# Patient Record
Sex: FEMALE | Race: WHITE | NOT HISPANIC OR LATINO
[De-identification: names, ages, dates, MRNs, and addresses within clinical notes are randomized per-mention and may not be internally consistent; named-entity substitution may affect disease eponyms.]

---

## 2017-04-20 ENCOUNTER — APPOINTMENT (OUTPATIENT)
Dept: UROLOGY | Facility: CLINIC | Age: 67
End: 2017-04-20

## 2017-04-20 VITALS
HEART RATE: 80 BPM | TEMPERATURE: 98.4 F | SYSTOLIC BLOOD PRESSURE: 110 MMHG | OXYGEN SATURATION: 97 % | DIASTOLIC BLOOD PRESSURE: 80 MMHG

## 2017-04-20 DIAGNOSIS — R31.29 OTHER MICROSCOPIC HEMATURIA: ICD-10-CM

## 2017-04-24 LAB
APPEARANCE: CLEAR
BACTERIA UR CULT: NORMAL
BACTERIA: NEGATIVE
BILIRUBIN URINE: NEGATIVE
BLOOD URINE: NEGATIVE
COLOR: YELLOW
CORE LAB FLUID CYTOLOGY: NORMAL
GLUCOSE QUALITATIVE U: NORMAL MG/DL
HYALINE CASTS: 1 /LPF
KETONES URINE: NEGATIVE
LEUKOCYTE ESTERASE URINE: NEGATIVE
MICROSCOPIC-UA: NORMAL
NITRITE URINE: NEGATIVE
PH URINE: 6.5
PROTEIN URINE: NEGATIVE MG/DL
RED BLOOD CELLS URINE: 1 /HPF
SPECIFIC GRAVITY URINE: 1.02
SQUAMOUS EPITHELIAL CELLS: 1 /HPF
UROBILINOGEN URINE: NORMAL MG/DL
WHITE BLOOD CELLS URINE: 0 /HPF

## 2017-05-25 ENCOUNTER — APPOINTMENT (OUTPATIENT)
Dept: UROLOGY | Facility: CLINIC | Age: 67
End: 2017-05-25

## 2017-05-25 VITALS — OXYGEN SATURATION: 96 % | HEART RATE: 72 BPM | SYSTOLIC BLOOD PRESSURE: 111 MMHG | DIASTOLIC BLOOD PRESSURE: 69 MMHG

## 2019-05-01 ENCOUNTER — APPOINTMENT (OUTPATIENT)
Dept: UROLOGY | Facility: CLINIC | Age: 69
End: 2019-05-01

## 2019-05-29 ENCOUNTER — APPOINTMENT (OUTPATIENT)
Dept: UROLOGY | Facility: CLINIC | Age: 69
End: 2019-05-29
Payer: MEDICARE

## 2019-05-29 ENCOUNTER — OUTPATIENT (OUTPATIENT)
Dept: OUTPATIENT SERVICES | Facility: HOSPITAL | Age: 69
LOS: 1 days | End: 2019-05-29
Payer: MEDICARE

## 2019-05-29 VITALS
SYSTOLIC BLOOD PRESSURE: 158 MMHG | BODY MASS INDEX: 22.97 KG/M2 | HEIGHT: 60 IN | OXYGEN SATURATION: 98 % | HEART RATE: 86 BPM | DIASTOLIC BLOOD PRESSURE: 76 MMHG | WEIGHT: 117 LBS

## 2019-05-29 PROCEDURE — 52000 CYSTOURETHROSCOPY: CPT

## 2019-06-06 DIAGNOSIS — D49.4 NEOPLASM OF UNSPECIFIED BEHAVIOR OF BLADDER: ICD-10-CM

## 2019-06-06 LAB — URINE CYTOLOGY: NORMAL

## 2019-07-31 ENCOUNTER — LABORATORY RESULT (OUTPATIENT)
Age: 69
End: 2019-07-31

## 2019-07-31 ENCOUNTER — APPOINTMENT (OUTPATIENT)
Dept: INTERNAL MEDICINE | Facility: CLINIC | Age: 69
End: 2019-07-31
Payer: MEDICARE

## 2019-07-31 VITALS
DIASTOLIC BLOOD PRESSURE: 68 MMHG | BODY MASS INDEX: 39.09 KG/M2 | RESPIRATION RATE: 16 BRPM | OXYGEN SATURATION: 98 % | TEMPERATURE: 98.7 F | SYSTOLIC BLOOD PRESSURE: 122 MMHG | WEIGHT: 199.13 LBS | HEIGHT: 60 IN | HEART RATE: 75 BPM

## 2019-07-31 PROCEDURE — 36415 COLL VENOUS BLD VENIPUNCTURE: CPT

## 2019-07-31 PROCEDURE — 99214 OFFICE O/P EST MOD 30 MIN: CPT | Mod: 25

## 2019-07-31 NOTE — PHYSICAL EXAM
[No Acute Distress] : no acute distress [Well Nourished] : well nourished [Well Developed] : well developed [Well-Appearing] : well-appearing [PERRL] : pupils equal round and reactive to light [Normal Sclera/Conjunctiva] : normal sclera/conjunctiva [EOMI] : extraocular movements intact [Normal Outer Ear/Nose] : the outer ears and nose were normal in appearance [Normal Oropharynx] : the oropharynx was normal [No JVD] : no jugular venous distention [Supple] : supple [No Lymphadenopathy] : no lymphadenopathy [Thyroid Normal, No Nodules] : the thyroid was normal and there were no nodules present [No Respiratory Distress] : no respiratory distress  [No Accessory Muscle Use] : no accessory muscle use [Clear to Auscultation] : lungs were clear to auscultation bilaterally [Normal Rate] : normal rate  [Regular Rhythm] : with a regular rhythm [No Murmur] : no murmur heard [Normal S1, S2] : normal S1 and S2 [No Carotid Bruits] : no carotid bruits [No Abdominal Bruit] : a ~M bruit was not heard ~T in the abdomen [No Varicosities] : no varicosities [Pedal Pulses Present] : the pedal pulses are present [No Edema] : there was no peripheral edema [No Palpable Aorta] : no palpable aorta [No Extremity Clubbing/Cyanosis] : no extremity clubbing/cyanosis [Non Tender] : non-tender [Soft] : abdomen soft [Non-distended] : non-distended [No Masses] : no abdominal mass palpated [No HSM] : no HSM [Normal Bowel Sounds] : normal bowel sounds [Normal Posterior Cervical Nodes] : no posterior cervical lymphadenopathy [Normal Anterior Cervical Nodes] : no anterior cervical lymphadenopathy [No CVA Tenderness] : no CVA  tenderness [No Spinal Tenderness] : no spinal tenderness [No Joint Swelling] : no joint swelling [Grossly Normal Strength/Tone] : grossly normal strength/tone [No Rash] : no rash [Coordination Grossly Intact] : coordination grossly intact [No Focal Deficits] : no focal deficits [Normal Gait] : normal gait [Deep Tendon Reflexes (DTR)] : deep tendon reflexes were 2+ and symmetric [Normal Insight/Judgement] : insight and judgment were intact [Normal Affect] : the affect was normal

## 2019-08-01 ENCOUNTER — RX RENEWAL (OUTPATIENT)
Age: 69
End: 2019-08-01

## 2019-08-01 NOTE — ADDENDUM
[FreeTextEntry1] : I, Rudy Rome, acted solely as scribe for Dr. Giorgi Garcia DO on this date 07/31/2019  1:30PM .\par \par All medical record entries made by the Scribe were at my, Dr. Giorgi Garcia DO direction and personally dictated by me on 07/31/2019  1:30PM. I have reviewed the chart and agree that the record accurately reflects my personal performance of the history, physical exam, assessment and plan. I have also personally directed, reviewed and agreed with the chart.\par

## 2019-08-01 NOTE — HISTORY OF PRESENT ILLNESS
[FreeTextEntry1] : blood work and general follow-up\par  [de-identified] : Patient is a 69 year old female with a past medical history as below who presents for blood work and general follow-up. Patient states she is taking all medications as prescribed and denies any adverse reactions or side effects. She denies heat/cold intolerance on Levothyroxine. She also notes she is currently taking numerous vitamins/supplements. Patient states last blood work was done about 1 year ago. Patient notes recent trip to [place].

## 2019-08-01 NOTE — ASSESSMENT
[FreeTextEntry1] : Patient is a 69 year old female with a past medical history as above who presents for blood work and general follow-up.\par

## 2019-08-01 NOTE — PLAN
[FreeTextEntry1] : Endocrinology\par hypothyroidism - continue Levothyroxine Sodium 75mcg p.o.q.d. - check thyroid panel - advised to follow up every 6 months to have thyroid function checked \par hypercholesterolemia - continue Niacin 500mg p.o.q.d. - continue low cholesterol/low fat diet - check FLP and LFTs\par Infectious Disease\par Patient to follow up with list of vaccines she received prior to her trip to [place]. \par osteoporosis-patient has seen Dr. Thorpe and is taking only vitamin D and calcium, check DEXA scan q 2 years\par \par check female panel, thyroid panel, vitamin panel, iron/ferritin studies\par Patient to follow up for annual physical exam. \par \par \par \par

## 2019-08-05 LAB
24R-OH-CALCIDIOL SERPL-MCNC: 60.2 PG/ML
25(OH)D3 SERPL-MCNC: 86.9 NG/ML
ALBUMIN SERPL ELPH-MCNC: 4.9 G/DL
ALP BLD-CCNC: 63 U/L
ALT SERPL-CCNC: 28 U/L
ANION GAP SERPL CALC-SCNC: 13 MMOL/L
AST SERPL-CCNC: 22 U/L
BASOPHILS # BLD AUTO: 0.02 K/UL
BASOPHILS NFR BLD AUTO: 0.3 %
BILIRUB SERPL-MCNC: 0.3 MG/DL
BUN SERPL-MCNC: 16 MG/DL
CALCIUM SERPL-MCNC: 10.1 MG/DL
CHLORIDE SERPL-SCNC: 103 MMOL/L
CHOLEST SERPL-MCNC: 208 MG/DL
CHOLEST/HDLC SERPL: 4.2 RATIO
CO2 SERPL-SCNC: 25 MMOL/L
CREAT SERPL-MCNC: 0.63 MG/DL
EOSINOPHIL # BLD AUTO: 0.08 K/UL
EOSINOPHIL NFR BLD AUTO: 1.3 %
ESTIMATED AVERAGE GLUCOSE: 108 MG/DL
FERRITIN SERPL-MCNC: 91 NG/ML
GLUCOSE SERPL-MCNC: 102 MG/DL
HBA1C MFR BLD HPLC: 5.4 %
HCT VFR BLD CALC: 45.7 %
HCT VFR BLD CALC: 45.7 %
HDLC SERPL-MCNC: 49 MG/DL
HGB BLD-MCNC: 14.2 G/DL
HGB BLD-MCNC: 14.2 G/DL
IMM GRANULOCYTES NFR BLD AUTO: 0.3 %
IRON SATN MFR SERPL: 32 %
IRON SERPL-MCNC: 119 UG/DL
LDLC SERPL CALC-MCNC: 140 MG/DL
LYMPHOCYTES # BLD AUTO: 1.39 K/UL
LYMPHOCYTES NFR BLD AUTO: 23.4 %
MAGNESIUM SERPL-MCNC: 2 MG/DL
MAN DIFF?: NORMAL
MCHC RBC-ENTMCNC: 29.5 PG
MCHC RBC-ENTMCNC: 31.1 GM/DL
MCV RBC AUTO: 95 FL
MONOCYTES # BLD AUTO: 0.49 K/UL
MONOCYTES NFR BLD AUTO: 8.2 %
NEUTROPHILS # BLD AUTO: 3.95 K/UL
NEUTROPHILS NFR BLD AUTO: 66.5 %
PLATELET # BLD AUTO: 280 K/UL
POTASSIUM SERPL-SCNC: 4.4 MMOL/L
PROT SERPL-MCNC: 7.4 G/DL
RBC # BLD: 4.81 M/UL
RBC # FLD: 13.1 %
SODIUM SERPL-SCNC: 141 MMOL/L
T3 SERPL-MCNC: 141 NG/DL
T3FREE SERPL-MCNC: 3.61 PG/ML
T3RU NFR SERPL: 1.1 TBI
T4 FREE SERPL-MCNC: 1.2 NG/DL
T4 SERPL-MCNC: 7.6 UG/DL
THYROGLOB AB SERPL-ACNC: <20 IU/ML
THYROPEROXIDASE AB SERPL IA-ACNC: 145 IU/ML
TIBC SERPL-MCNC: 375 UG/DL
TRIGL SERPL-MCNC: 94 MG/DL
TSH SERPL-ACNC: 1.03 UIU/ML
UIBC SERPL-MCNC: 256 UG/DL
VIT B1 SERPL-MCNC: 153.2 NMOL/L
VIT B12 SERPL-MCNC: 503 PG/ML
VIT C SERPL-MCNC: 0.2 MG/DL
WBC # FLD AUTO: 5.95 K/UL

## 2019-08-06 ENCOUNTER — RX RENEWAL (OUTPATIENT)
Age: 69
End: 2019-08-06

## 2019-08-06 LAB — VIT A SERPL-MCNC: 47 UG/DL

## 2019-08-07 LAB — UBIQUINONE10 SERPL-MCNC: 2 MG/L

## 2019-10-21 ENCOUNTER — APPOINTMENT (OUTPATIENT)
Dept: INTERNAL MEDICINE | Facility: CLINIC | Age: 69
End: 2019-10-21
Payer: MEDICARE

## 2019-10-21 VITALS
HEART RATE: 69 BPM | SYSTOLIC BLOOD PRESSURE: 110 MMHG | BODY MASS INDEX: 23.26 KG/M2 | RESPIRATION RATE: 14 BRPM | OXYGEN SATURATION: 98 % | WEIGHT: 118.5 LBS | HEIGHT: 60 IN | TEMPERATURE: 98.2 F | DIASTOLIC BLOOD PRESSURE: 62 MMHG

## 2019-10-21 PROCEDURE — 36415 COLL VENOUS BLD VENIPUNCTURE: CPT

## 2019-10-21 PROCEDURE — G0444 DEPRESSION SCREEN ANNUAL: CPT | Mod: 59

## 2019-10-21 PROCEDURE — 99214 OFFICE O/P EST MOD 30 MIN: CPT | Mod: 25

## 2019-10-21 PROCEDURE — G0442 ANNUAL ALCOHOL SCREEN 15 MIN: CPT | Mod: 59

## 2019-10-21 NOTE — PLAN
[FreeTextEntry1] : Endocrinology\par hypothyroidism - continue Levothyroxine Sodium 75mcg p.o.q.d. as directed and Liothyronine Sodium 5mcg p.o.q.d. as directed  - thyroid panel to be rechecked in 3 months \par hypercholesterolemia - continue Niacin 500mg p.o.q.d. and Omega-3 1000mg p.o.q.d. - check FLP \par hyperglycemia - continue low carbohydrate/low sugar diet - check hemoglobin A1C\par vitamin D insufficiency - continue vitamin D 2000 unit capsules p.o.q.d. - check vitamin D \par Gynecology\par Will request results of last mammogram/breast US from [facility]\par Gastroenterology\par Will request results of last screening colonoscopy from colorectal surgeon, Dr. Cavanaugh \par Musculoskeletal\par osteoporosis - continue Glucosamine capsules p.o.q.d. - bone density testing (DEXA scan) to be done every 2 years\par \par check FLP, hemoglobin A1C, and vitamin D

## 2019-10-21 NOTE — HISTORY OF PRESENT ILLNESS
[FreeTextEntry1] : fasting blood work and general follow-up\par  [de-identified] : Patient is a 69 year old female with a past medical history as below who presents for fasting blood work and general follow-up. Patient states she is taking all medications as prescribed and denies any adverse reactions or side effects. She denies heat or cold intolerance on Levothyroxine/Liothyronine. Patient states last colonoscopy was 5 years ago with colorectal surgeon, Dr. Cavanaugh. She was instructed to follow up for her next screening colonoscopy after 10 years. Patient states she has breast imaging done annually at [facility]. She has seen gynecologist, Dr. Alas in the past year. Past carotid Doppler was normal.

## 2019-10-21 NOTE — ADDENDUM
[FreeTextEntry1] : I, Rudy Rome, acted solely as scribe for Dr. Giorgi Garcia DO on this date 10/21/2019 11:40AM .\par \par All medical record entries made by the Scribe were at my, Dr. Giorgi Garcia DO direction and personally dictated by me on 10/21/2019 11:40AM. I have reviewed the chart and agree that the record accurately reflects my personal performance of the history, physical exam, assessment and plan. I have also personally directed, reviewed and agreed with the chart.\par

## 2019-10-21 NOTE — HEALTH RISK ASSESSMENT
[Yes] : Yes [2 - 4 times a month (2 pts)] : 2-4 times a month (2 points) [Never (0 pts)] : Never (0 points) [1 or 2 (0 pts)] : 1 or 2 (0 points) [No] : In the past 12 months have you used drugs other than those required for medical reasons? No [0] : 1) Little interest or pleasure doing things: Not at all (0) [Audit-CScore] : 2 [] : No [[id number]] : 0

## 2019-10-21 NOTE — PHYSICAL EXAM
[No Acute Distress] : no acute distress [Well Nourished] : well nourished [Well Developed] : well developed [Well-Appearing] : well-appearing [Normal Sclera/Conjunctiva] : normal sclera/conjunctiva [PERRL] : pupils equal round and reactive to light [Normal Outer Ear/Nose] : the outer ears and nose were normal in appearance [EOMI] : extraocular movements intact [No JVD] : no jugular venous distention [Normal Oropharynx] : the oropharynx was normal [No Lymphadenopathy] : no lymphadenopathy [Supple] : supple [Thyroid Normal, No Nodules] : the thyroid was normal and there were no nodules present [No Respiratory Distress] : no respiratory distress  [No Accessory Muscle Use] : no accessory muscle use [Normal Rate] : normal rate  [Clear to Auscultation] : lungs were clear to auscultation bilaterally [Regular Rhythm] : with a regular rhythm [Normal S1, S2] : normal S1 and S2 [No Murmur] : no murmur heard [No Carotid Bruits] : no carotid bruits [No Abdominal Bruit] : a ~M bruit was not heard ~T in the abdomen [No Varicosities] : no varicosities [Pedal Pulses Present] : the pedal pulses are present [No Edema] : there was no peripheral edema [No Extremity Clubbing/Cyanosis] : no extremity clubbing/cyanosis [Soft] : abdomen soft [No Palpable Aorta] : no palpable aorta [Non Tender] : non-tender [No Masses] : no abdominal mass palpated [Non-distended] : non-distended [No HSM] : no HSM [Normal Bowel Sounds] : normal bowel sounds [Normal Anterior Cervical Nodes] : no anterior cervical lymphadenopathy [Normal Posterior Cervical Nodes] : no posterior cervical lymphadenopathy [No Spinal Tenderness] : no spinal tenderness [No CVA Tenderness] : no CVA  tenderness [No Rash] : no rash [Grossly Normal Strength/Tone] : grossly normal strength/tone [No Joint Swelling] : no joint swelling [No Focal Deficits] : no focal deficits [Coordination Grossly Intact] : coordination grossly intact [Normal Gait] : normal gait [Normal Affect] : the affect was normal [Deep Tendon Reflexes (DTR)] : deep tendon reflexes were 2+ and symmetric [Normal Insight/Judgement] : insight and judgment were intact

## 2019-10-23 LAB
25(OH)D3 SERPL-MCNC: 79.5 NG/ML
CHOLEST SERPL-MCNC: 187 MG/DL
CHOLEST/HDLC SERPL: 4.2 RATIO
ESTIMATED AVERAGE GLUCOSE: 120 MG/DL
HBA1C MFR BLD HPLC: 5.8 %
HDLC SERPL-MCNC: 45 MG/DL
LDLC SERPL CALC-MCNC: 124 MG/DL
TRIGL SERPL-MCNC: 91 MG/DL

## 2020-02-26 ENCOUNTER — LABORATORY RESULT (OUTPATIENT)
Age: 70
End: 2020-02-26

## 2020-02-26 ENCOUNTER — APPOINTMENT (OUTPATIENT)
Dept: INTERNAL MEDICINE | Facility: CLINIC | Age: 70
End: 2020-02-26
Payer: MEDICARE

## 2020-02-26 VITALS
WEIGHT: 117.38 LBS | OXYGEN SATURATION: 98 % | RESPIRATION RATE: 16 BRPM | SYSTOLIC BLOOD PRESSURE: 138 MMHG | TEMPERATURE: 98.1 F | HEIGHT: 60 IN | HEART RATE: 75 BPM | BODY MASS INDEX: 23.04 KG/M2 | DIASTOLIC BLOOD PRESSURE: 72 MMHG

## 2020-02-26 PROCEDURE — 36415 COLL VENOUS BLD VENIPUNCTURE: CPT

## 2020-02-26 PROCEDURE — 99214 OFFICE O/P EST MOD 30 MIN: CPT | Mod: 25

## 2020-02-26 NOTE — PHYSICAL EXAM
[No Acute Distress] : no acute distress [Well Nourished] : well nourished [Well Developed] : well developed [Well-Appearing] : well-appearing [Normal Sclera/Conjunctiva] : normal sclera/conjunctiva [EOMI] : extraocular movements intact [PERRL] : pupils equal round and reactive to light [Normal Outer Ear/Nose] : the outer ears and nose were normal in appearance [No JVD] : no jugular venous distention [Normal Oropharynx] : the oropharynx was normal [Supple] : supple [No Lymphadenopathy] : no lymphadenopathy [No Respiratory Distress] : no respiratory distress  [Thyroid Normal, No Nodules] : the thyroid was normal and there were no nodules present [No Accessory Muscle Use] : no accessory muscle use [Clear to Auscultation] : lungs were clear to auscultation bilaterally [Regular Rhythm] : with a regular rhythm [Normal Rate] : normal rate  [Normal S1, S2] : normal S1 and S2 [No Murmur] : no murmur heard [No Carotid Bruits] : no carotid bruits [No Abdominal Bruit] : a ~M bruit was not heard ~T in the abdomen [No Varicosities] : no varicosities [No Palpable Aorta] : no palpable aorta [Pedal Pulses Present] : the pedal pulses are present [No Edema] : there was no peripheral edema [No Extremity Clubbing/Cyanosis] : no extremity clubbing/cyanosis [Soft] : abdomen soft [Non-distended] : non-distended [Non Tender] : non-tender [No HSM] : no HSM [No Masses] : no abdominal mass palpated [Normal Anterior Cervical Nodes] : no anterior cervical lymphadenopathy [Normal Bowel Sounds] : normal bowel sounds [Normal Posterior Cervical Nodes] : no posterior cervical lymphadenopathy [No Spinal Tenderness] : no spinal tenderness [No Joint Swelling] : no joint swelling [No CVA Tenderness] : no CVA  tenderness [No Rash] : no rash [Grossly Normal Strength/Tone] : grossly normal strength/tone [Coordination Grossly Intact] : coordination grossly intact [No Focal Deficits] : no focal deficits [Normal Gait] : normal gait [Deep Tendon Reflexes (DTR)] : deep tendon reflexes were 2+ and symmetric [Normal Affect] : the affect was normal [Normal Insight/Judgement] : insight and judgment were intact

## 2020-02-27 NOTE — HEALTH RISK ASSESSMENT
[Yes] : Yes [2 - 4 times a month (2 pts)] : 2-4 times a month (2 points) [1 or 2 (0 pts)] : 1 or 2 (0 points) [Never (0 pts)] : Never (0 points) [No] : In the past 12 months have you used drugs other than those required for medical reasons? No [0] : 2) Feeling down, depressed, or hopeless: Not at all (0) [MammogramDate] : 03/19 [BoneDensityDate] : 03/16 [MammogramComments] : No evidence of malignancy with benign findings; BI-RADS 2: Benign findings.  [BoneDensityComments] : Revealed osteoporosis.  [] : No [Audit-CScore] : 2 [[id number]] : 0

## 2020-02-27 NOTE — PLAN
[FreeTextEntry1] : Endocrinology\par hypothyroidism - continue Levothyroxine Sodium 75mcg p.o.q.d. as directed and Liothyronine Sodium 5mcg p.o.q.d. as directed  - check thyroid panel\par hypercholesterolemia - continue Niacin 500mg p.o.q.d. - check FLP \par hyperglycemia - continue low carbohydrate/low sugar diet - check hemoglobin A1C\par vitamin D insufficiency - continue vitamin D 2000 unit capsules p.o.q.d. - check vitamin panel \par Musculoskeletal\par osteoporosis - bone density testing (DEXA scan) to be done every 2 years\par Infectious Disease\par check Hepatitis C Ab\par \par check FLP, hemoglobin A1C, thyroid panel, vitamin panel, and Hepatitis C Ab

## 2020-02-27 NOTE — HISTORY OF PRESENT ILLNESS
[FreeTextEntry1] : fasting blood work and general follow-up\par  [de-identified] : Patient is a 69 year old female with a past medical history as below who presents for fasting blood work and general follow-up. Patient states she is taking all medications as prescribed. She notes cold intolerance, typically after returning from [place]. Patient notes recent tooth extraction. Patient states she has been trying to maintain a well-balanced diet and notes she exercises regularly (water aerobics, walking, biking). \par

## 2020-02-27 NOTE — ADDENDUM
[FreeTextEntry1] : I, Rudy Rome, acted solely as scribe for Dr. Giorgi Garcia DO on this date 02/26/2020 10:10AM .\par \par All medical record entries made by the Scribe were at my, Dr. Giorgi Garcia DO direction and personally dictated by me on 02/26/2020 10:10AM. I have reviewed the chart and agree that the record accurately reflects my personal performance of the history, physical exam, assessment and plan. I have also personally directed, reviewed and agreed with the chart.\par

## 2020-02-28 LAB
24R-OH-CALCIDIOL SERPL-MCNC: 88 PG/ML
25(OH)D3 SERPL-MCNC: 75.2 NG/ML
CHOLEST SERPL-MCNC: 202 MG/DL
CHOLEST/HDLC SERPL: 4.2 RATIO
ESTIMATED AVERAGE GLUCOSE: 114 MG/DL
HBA1C MFR BLD HPLC: 5.6 %
HCT VFR BLD CALC: 44.9 %
HCV AB SER QL: NONREACTIVE
HCV S/CO RATIO: 0.32 S/CO
HDLC SERPL-MCNC: 48 MG/DL
HGB BLD-MCNC: 14.1 G/DL
IRON SERPL-MCNC: 112 UG/DL
LDLC SERPL CALC-MCNC: 136 MG/DL
MAGNESIUM SERPL-MCNC: 2 MG/DL
POTASSIUM SERPL-SCNC: 4.5 MMOL/L
T3 SERPL-MCNC: 178 NG/DL
T3FREE SERPL-MCNC: 4.19 PG/ML
T3RU NFR SERPL: 1.1 TBI
T4 FREE SERPL-MCNC: 1.3 NG/DL
T4 SERPL-MCNC: 7.9 UG/DL
THYROGLOB AB SERPL-ACNC: <20 IU/ML
THYROPEROXIDASE AB SERPL IA-ACNC: 81 IU/ML
TRIGL SERPL-MCNC: 91 MG/DL
TSH SERPL-ACNC: 1.23 UIU/ML
VIT B12 SERPL-MCNC: 1105 PG/ML

## 2020-03-02 LAB
UBIQUINONE10 SERPL-MCNC: 2.2 MG/L
VIT A SERPL-MCNC: 41.5 UG/DL
VIT B1 SERPL-MCNC: 168.3 NMOL/L
VIT C SERPL-MCNC: 0.3 MG/DL

## 2020-05-21 ENCOUNTER — RX RENEWAL (OUTPATIENT)
Age: 70
End: 2020-05-21

## 2020-06-15 ENCOUNTER — LABORATORY RESULT (OUTPATIENT)
Age: 70
End: 2020-06-15

## 2020-06-15 ENCOUNTER — APPOINTMENT (OUTPATIENT)
Dept: INTERNAL MEDICINE | Facility: CLINIC | Age: 70
End: 2020-06-15
Payer: MEDICARE

## 2020-06-15 VITALS
BODY MASS INDEX: 22.78 KG/M2 | OXYGEN SATURATION: 98 % | RESPIRATION RATE: 16 BRPM | HEART RATE: 70 BPM | HEIGHT: 60 IN | WEIGHT: 116 LBS | SYSTOLIC BLOOD PRESSURE: 116 MMHG | DIASTOLIC BLOOD PRESSURE: 70 MMHG | TEMPERATURE: 98.8 F

## 2020-06-15 DIAGNOSIS — Z11.59 ENCOUNTER FOR SCREENING FOR OTHER VIRAL DISEASES: ICD-10-CM

## 2020-06-15 PROCEDURE — 99214 OFFICE O/P EST MOD 30 MIN: CPT | Mod: 25

## 2020-06-15 PROCEDURE — 36415 COLL VENOUS BLD VENIPUNCTURE: CPT

## 2020-06-15 NOTE — PLAN
[FreeTextEntry1] : Endocrinology\par hypothyroidism - continue Levothyroxine Sodium 75mcg p.o.q.d. as directed and Liothyronine Sodium 5mcg p.o.q.d. as directed  - check thyroid panel\par hypercholesterolemia - continue Niacin 500mg p.o.q.d. - check FLP \par hyperglycemia - continue low carbohydrate/low sugar diet - check hemoglobin A1C\par vitamin D insufficiency - continue vitamin D 2000 unit capsules p.o.q.d. with meals - check vitamin D \par Musculoskeletal\par osteoporosis - bone density testing (DEXA scan) to be done every 2 years\par Infectious Disease\par check COVID-19 IgG antibody\par \par check female panel, thyroid panel, and COVID-19 IgG antibody

## 2020-06-15 NOTE — HISTORY OF PRESENT ILLNESS
[FreeTextEntry1] : fasting blood work and general follow-up\par  [de-identified] : Patient is a 69 year old female with a past medical history as below who presents for fasting blood work and general follow-up. Patient states she is taking all medications as prescribed. She denies heat/cold intolerance on Levothyroxine Sodium and Liothyronine Sodium. Patient notes her weight has fluctuated.

## 2020-06-15 NOTE — PHYSICAL EXAM
[No Acute Distress] : no acute distress [Well Nourished] : well nourished [Well Developed] : well developed [Well-Appearing] : well-appearing [Normal Sclera/Conjunctiva] : normal sclera/conjunctiva [Normal Outer Ear/Nose] : the outer ears and nose were normal in appearance [Normal Oropharynx] : the oropharynx was normal [PERRL] : pupils equal round and reactive to light [EOMI] : extraocular movements intact [No JVD] : no jugular venous distention [Supple] : supple [No Lymphadenopathy] : no lymphadenopathy [No Respiratory Distress] : no respiratory distress  [Thyroid Normal, No Nodules] : the thyroid was normal and there were no nodules present [No Accessory Muscle Use] : no accessory muscle use [Normal Rate] : normal rate  [Clear to Auscultation] : lungs were clear to auscultation bilaterally [Regular Rhythm] : with a regular rhythm [Normal S1, S2] : normal S1 and S2 [No Murmur] : no murmur heard [No Carotid Bruits] : no carotid bruits [Pedal Pulses Present] : the pedal pulses are present [No Abdominal Bruit] : a ~M bruit was not heard ~T in the abdomen [No Varicosities] : no varicosities [No Palpable Aorta] : no palpable aorta [No Edema] : there was no peripheral edema [Non Tender] : non-tender [Soft] : abdomen soft [No Extremity Clubbing/Cyanosis] : no extremity clubbing/cyanosis [No Masses] : no abdominal mass palpated [Non-distended] : non-distended [No HSM] : no HSM [Normal Bowel Sounds] : normal bowel sounds [No CVA Tenderness] : no CVA  tenderness [Normal Posterior Cervical Nodes] : no posterior cervical lymphadenopathy [Normal Anterior Cervical Nodes] : no anterior cervical lymphadenopathy [Grossly Normal Strength/Tone] : grossly normal strength/tone [No Spinal Tenderness] : no spinal tenderness [No Joint Swelling] : no joint swelling [No Focal Deficits] : no focal deficits [No Rash] : no rash [Coordination Grossly Intact] : coordination grossly intact [Normal Affect] : the affect was normal [Normal Gait] : normal gait [Deep Tendon Reflexes (DTR)] : deep tendon reflexes were 2+ and symmetric [Normal Insight/Judgement] : insight and judgment were intact

## 2020-06-15 NOTE — HEALTH RISK ASSESSMENT
[Yes] : Yes [1 or 2 (0 pts)] : 1 or 2 (0 points) [2 - 4 times a month (2 pts)] : 2-4 times a month (2 points) [No] : In the past 12 months have you used drugs other than those required for medical reasons? No [Never (0 pts)] : Never (0 points) [0] : 1) Little interest or pleasure doing things: Not at all (0) [] : No [Audit-CScore] : 2 [[id number]] : 0 [MammogramDate] : 03/19 [BoneDensityDate] : 03/16 [MammogramComments] : No evidence of malignancy with benign findings; BI-RADS 2: Benign findings.  [BoneDensityComments] : Revealed osteoporosis.

## 2020-06-19 LAB
25(OH)D3 SERPL-MCNC: 69.9 NG/ML
ALBUMIN SERPL ELPH-MCNC: 4.8 G/DL
ALP BLD-CCNC: 49 U/L
ALT SERPL-CCNC: 15 U/L
ANION GAP SERPL CALC-SCNC: 15 MMOL/L
AST SERPL-CCNC: 17 U/L
BASOPHILS # BLD AUTO: 0.04 K/UL
BASOPHILS NFR BLD AUTO: 0.7 %
BILIRUB SERPL-MCNC: 0.3 MG/DL
BUN SERPL-MCNC: 17 MG/DL
CALCIUM SERPL-MCNC: 10.2 MG/DL
CHLORIDE SERPL-SCNC: 102 MMOL/L
CHOLEST SERPL-MCNC: 206 MG/DL
CHOLEST/HDLC SERPL: 4.3 RATIO
CO2 SERPL-SCNC: 25 MMOL/L
CREAT SERPL-MCNC: 0.57 MG/DL
EOSINOPHIL # BLD AUTO: 0.08 K/UL
EOSINOPHIL NFR BLD AUTO: 1.4 %
ESTIMATED AVERAGE GLUCOSE: 117 MG/DL
GLUCOSE SERPL-MCNC: 88 MG/DL
HBA1C MFR BLD HPLC: 5.7 %
HCT VFR BLD CALC: 42.4 %
HDLC SERPL-MCNC: 48 MG/DL
HGB BLD-MCNC: 12.8 G/DL
IMM GRANULOCYTES NFR BLD AUTO: 0.4 %
LDLC SERPL CALC-MCNC: 145 MG/DL
LYMPHOCYTES # BLD AUTO: 1.36 K/UL
LYMPHOCYTES NFR BLD AUTO: 23.9 %
MAN DIFF?: NORMAL
MCHC RBC-ENTMCNC: 28.6 PG
MCHC RBC-ENTMCNC: 30.2 GM/DL
MCV RBC AUTO: 94.9 FL
MONOCYTES # BLD AUTO: 0.52 K/UL
MONOCYTES NFR BLD AUTO: 9.1 %
NEUTROPHILS # BLD AUTO: 3.68 K/UL
NEUTROPHILS NFR BLD AUTO: 64.5 %
PLATELET # BLD AUTO: 274 K/UL
POTASSIUM SERPL-SCNC: 4.1 MMOL/L
PROT SERPL-MCNC: 6.7 G/DL
RBC # BLD: 4.47 M/UL
RBC # FLD: 13.3 %
SARS-COV-2 IGG SERPL IA-ACNC: 0.01 INDEX
SARS-COV-2 IGG SERPL QL IA: NEGATIVE
SODIUM SERPL-SCNC: 143 MMOL/L
T3 SERPL-MCNC: 133 NG/DL
T3FREE SERPL-MCNC: 3.62 PG/ML
T3RU NFR SERPL: 1.2 TBI
T4 FREE SERPL-MCNC: 1.3 NG/DL
T4 SERPL-MCNC: 7.6 UG/DL
THYROGLOB AB SERPL-ACNC: <20 IU/ML
THYROPEROXIDASE AB SERPL IA-ACNC: 173 IU/ML
TRIGL SERPL-MCNC: 63 MG/DL
TSH SERPL-ACNC: 1.19 UIU/ML
WBC # FLD AUTO: 5.7 K/UL

## 2020-08-14 ENCOUNTER — RX RENEWAL (OUTPATIENT)
Age: 70
End: 2020-08-14

## 2020-10-16 ENCOUNTER — RX RENEWAL (OUTPATIENT)
Age: 70
End: 2020-10-16

## 2020-11-23 ENCOUNTER — LABORATORY RESULT (OUTPATIENT)
Age: 70
End: 2020-11-23

## 2020-11-23 ENCOUNTER — APPOINTMENT (OUTPATIENT)
Dept: INTERNAL MEDICINE | Facility: CLINIC | Age: 70
End: 2020-11-23
Payer: MEDICARE

## 2020-11-23 VITALS
RESPIRATION RATE: 16 BRPM | BODY MASS INDEX: 23.06 KG/M2 | HEIGHT: 60 IN | WEIGHT: 117.44 LBS | TEMPERATURE: 97.6 F | DIASTOLIC BLOOD PRESSURE: 88 MMHG | SYSTOLIC BLOOD PRESSURE: 162 MMHG | HEART RATE: 78 BPM | OXYGEN SATURATION: 97 %

## 2020-11-23 VITALS — SYSTOLIC BLOOD PRESSURE: 150 MMHG | DIASTOLIC BLOOD PRESSURE: 80 MMHG

## 2020-11-23 DIAGNOSIS — Z23 ENCOUNTER FOR IMMUNIZATION: ICD-10-CM

## 2020-11-23 DIAGNOSIS — Z12.39 ENCOUNTER FOR OTHER SCREENING FOR MALIGNANT NEOPLASM OF BREAST: ICD-10-CM

## 2020-11-23 PROCEDURE — 99214 OFFICE O/P EST MOD 30 MIN: CPT | Mod: 25

## 2020-11-23 PROCEDURE — G0009: CPT

## 2020-11-23 PROCEDURE — 90732 PPSV23 VACC 2 YRS+ SUBQ/IM: CPT

## 2020-11-23 PROCEDURE — 36415 COLL VENOUS BLD VENIPUNCTURE: CPT

## 2020-11-23 NOTE — PLAN
[FreeTextEntry1] : Cardiology\par elevated BP - advised low sodium diet; BP to be rechecked in 4-6 weeks during annual wellness visit\par hypercholesterolemia - continue Niacin 500mg p.o.q.d. - check FLP \par Endocrinology\par hypothyroidism - continue Levothyroxine Sodium 75mcg p.o.q.d. as directed, Rx filled and Liothyronine Sodium 5mcg p.o.q.d. as directed, Rx filled - check thyroid panel\par hyperglycemia - continue low carbohydrate/low sugar diet - check hemoglobin A1C\par vitamin D insufficiency - continue vitamin D 2000 IU p.o.q.d. with meals as directed - check vitamin D \par Gynecology\par Rx given for mammogram and breast US\par Gastroenterology\par history of hemorrhoids - check CBC, iron/TIBC, and serum ferritin\par Repeat colonoscopy to be done in 4 years\par Immunization\par flu vaccine - evidence of vaccine administration to be requested\par Pneumovax 23 - 0.5mL x 1 administered intramuscularly to left deltoid; Prevnar 13 to be administered in 1 year\par Shingrix - discussed, refused\par \par check female panel, hemoglobin A1C, thyroid panel, iron/TIBC, and serum ferritin \par Patient to follow up for annual wellness visit.

## 2020-11-23 NOTE — ASSESSMENT
[FreeTextEntry1] : Patient is a 70 year old female with a past medical history as above who presents for fasting blood work, Rx refill, and general follow-up.\par

## 2020-11-23 NOTE — HEALTH RISK ASSESSMENT
[Yes] : Yes [2 - 4 times a month (2 pts)] : 2-4 times a month (2 points) [1 or 2 (0 pts)] : 1 or 2 (0 points) [Never (0 pts)] : Never (0 points) [No] : In the past 12 months have you used drugs other than those required for medical reasons? No [0] : 2) Feeling down, depressed, or hopeless: Not at all (0) [Fully functional (bathing, dressing, toileting, transferring, walking, feeding)] : Fully functional (bathing, dressing, toileting, transferring, walking, feeding) [Fully functional (using the telephone, shopping, preparing meals, housekeeping, doing laundry, using] : Fully functional and needs no help or supervision to perform IADLs (using the telephone, shopping, preparing meals, housekeeping, doing laundry, using transportation, managing medications and managing finances) [] : No [Audit-CScore] : 2 [[id number]] : 0 [MammogramDate] : 03/19 [MammogramComments] : BI-RADS 2: Benign findings; Rx given for mammogram and breast US. [BoneDensityDate] : 03/16 [BoneDensityComments] : Osteoporosis.  [ColonoscopyDate] : 02/14 [ColonoscopyComments] : Normal; repeat in 4 years. [HepatitisCDate] : 02/20 [HepatitisCComments] : Nonreactive.

## 2020-11-23 NOTE — ADDENDUM
[FreeTextEntry1] : I, Rudy Rome, acted solely as scribe for Dr. Giorgi Garcia DO on this date 11/23/2020  9:30AM .\par \par All medical record entries made by the Scribe were at my, Dr. Giorgi Garcia DO direction and personally dictated by me on 11/23/2020  9:30AM. I have reviewed the chart and agree that the record accurately reflects my personal performance of the history, physical exam, assessment and plan. I have also personally directed, reviewed and agreed with the chart.\par

## 2020-11-23 NOTE — HISTORY OF PRESENT ILLNESS
[FreeTextEntry1] : fasting blood work, Rx refill, and general follow-up\par  [de-identified] : Patient is a 70 year old female with a past medical history as below who presents for fasting blood work, Rx refill, and general follow-up. Patient states she is taking all medications as prescribed and denies any adverse reactions or side effects. She denies heat/cold intolerance on Levothyroxine Sodium and Liothyronine Sodium. Patient states she may be due for breast imaging (mammogram and breast US). Patient's last screening colonoscopy was in February 2014 with gastroenterologist, Dr.John Cavanaugh. She states she was advised to follow up in 10 years for a repeat screening. Patient states she has not been maintaining a low-sodium diet. Patient has already received the flu vaccine for this season at her pharmacy. She inquires about the Pneumonia Vaccines. She is not interested in receiving the Shingles Vaccine (Shingrix). Patient requests Rx refill for Levothyroxine Sodium and Liothyronine Sodium.

## 2020-12-01 LAB
25(OH)D3 SERPL-MCNC: 63.5 NG/ML
ALBUMIN SERPL ELPH-MCNC: 4.8 G/DL
ALP BLD-CCNC: 59 U/L
ALT SERPL-CCNC: 16 U/L
ANION GAP SERPL CALC-SCNC: 11 MMOL/L
AST SERPL-CCNC: 18 U/L
BASOPHILS # BLD AUTO: 0.04 K/UL
BASOPHILS NFR BLD AUTO: 0.7 %
BILIRUB SERPL-MCNC: 0.4 MG/DL
BUN SERPL-MCNC: 13 MG/DL
CALCIUM SERPL-MCNC: 9.9 MG/DL
CHLORIDE SERPL-SCNC: 103 MMOL/L
CHOLEST SERPL-MCNC: 196 MG/DL
CO2 SERPL-SCNC: 28 MMOL/L
CREAT SERPL-MCNC: 0.6 MG/DL
EOSINOPHIL # BLD AUTO: 0.14 K/UL
EOSINOPHIL NFR BLD AUTO: 2.5 %
ESTIMATED AVERAGE GLUCOSE: 117 MG/DL
FERRITIN SERPL-MCNC: 152 NG/ML
GLUCOSE SERPL-MCNC: 103 MG/DL
HBA1C MFR BLD HPLC: 5.7 %
HCT VFR BLD CALC: 46 %
HDLC SERPL-MCNC: 49 MG/DL
HGB BLD-MCNC: 14.2 G/DL
IMM GRANULOCYTES NFR BLD AUTO: 0.2 %
IRON SATN MFR SERPL: 38 %
IRON SERPL-MCNC: 125 UG/DL
LDLC SERPL CALC-MCNC: 130 MG/DL
LYMPHOCYTES # BLD AUTO: 1.16 K/UL
LYMPHOCYTES NFR BLD AUTO: 20.6 %
MAN DIFF?: NORMAL
MCHC RBC-ENTMCNC: 29 PG
MCHC RBC-ENTMCNC: 30.9 GM/DL
MCV RBC AUTO: 93.9 FL
MONOCYTES # BLD AUTO: 0.46 K/UL
MONOCYTES NFR BLD AUTO: 8.2 %
NEUTROPHILS # BLD AUTO: 3.82 K/UL
NEUTROPHILS NFR BLD AUTO: 67.8 %
NONHDLC SERPL-MCNC: 147 MG/DL
PLATELET # BLD AUTO: 252 K/UL
POTASSIUM SERPL-SCNC: 4.3 MMOL/L
PROT SERPL-MCNC: 7 G/DL
RBC # BLD: 4.9 M/UL
RBC # FLD: 12.9 %
SODIUM SERPL-SCNC: 142 MMOL/L
T3 SERPL-MCNC: 147 NG/DL
T3FREE SERPL-MCNC: 4 PG/ML
T3RU NFR SERPL: 1.1 TBI
T4 FREE SERPL-MCNC: 1.1 NG/DL
T4 SERPL-MCNC: 8.1 UG/DL
THYROGLOB AB SERPL-ACNC: <20 IU/ML
THYROPEROXIDASE AB SERPL IA-ACNC: 163 IU/ML
TIBC SERPL-MCNC: 329 UG/DL
TRIGL SERPL-MCNC: 83 MG/DL
TSH SERPL-ACNC: 1.91 UIU/ML
UIBC SERPL-MCNC: 204 UG/DL
WBC # FLD AUTO: 5.63 K/UL

## 2020-12-21 ENCOUNTER — APPOINTMENT (OUTPATIENT)
Dept: INTERNAL MEDICINE | Facility: CLINIC | Age: 70
End: 2020-12-21
Payer: MEDICARE

## 2020-12-21 ENCOUNTER — NON-APPOINTMENT (OUTPATIENT)
Age: 70
End: 2020-12-21

## 2020-12-21 VITALS
DIASTOLIC BLOOD PRESSURE: 70 MMHG | HEIGHT: 62 IN | TEMPERATURE: 98.9 F | SYSTOLIC BLOOD PRESSURE: 108 MMHG | BODY MASS INDEX: 21.53 KG/M2 | OXYGEN SATURATION: 98 % | WEIGHT: 117 LBS | HEART RATE: 80 BPM | RESPIRATION RATE: 16 BRPM

## 2020-12-21 DIAGNOSIS — Z87.891 PERSONAL HISTORY OF NICOTINE DEPENDENCE: ICD-10-CM

## 2020-12-21 PROCEDURE — 93000 ELECTROCARDIOGRAM COMPLETE: CPT

## 2020-12-21 PROCEDURE — G0439: CPT

## 2020-12-21 NOTE — ASSESSMENT
[FreeTextEntry1] : Patient is a 70 year old female with a past medical history as above who presents for an annual wellness visit.

## 2020-12-21 NOTE — ADDENDUM
[FreeTextEntry1] : I, Rudy Rome, acted solely as scribe for Dr. Giorgi Garcia DO on this date 12/21/2020  2:00PM .\par \par All medical record entries made by the Scribe were at my, Dr. Giorgi Garcia DO direction and personally dictated by me on 12/21/2020  2:00PM. I have reviewed the chart and agree that the record accurately reflects my personal performance of the history, physical exam, assessment and plan. I have also personally directed, reviewed and agreed with the chart.\par

## 2020-12-21 NOTE — HEALTH RISK ASSESSMENT
[Yes] : Yes [2 - 4 times a month (2 pts)] : 2-4 times a month (2 points) [1 or 2 (0 pts)] : 1 or 2 (0 points) [Never (0 pts)] : Never (0 points) [No] : In the past 12 months have you used drugs other than those required for medical reasons? No [0] : 2) Feeling down, depressed, or hopeless: Not at all (0) [Fully functional (bathing, dressing, toileting, transferring, walking, feeding)] : Fully functional (bathing, dressing, toileting, transferring, walking, feeding) [Fully functional (using the telephone, shopping, preparing meals, housekeeping, doing laundry, using] : Fully functional and needs no help or supervision to perform IADLs (using the telephone, shopping, preparing meals, housekeeping, doing laundry, using transportation, managing medications and managing finances) [] : No [Audit-CScore] : 2 [[id number]] : 0 [MammogramDate] : 06/20 [MammogramComments] : Benign findings; no mammographic or ultrasonographic evidence of malignancy.  [BoneDensityDate] : 03/16 [BoneDensityComments] : Osteoporosis; Rx given for repeat DEXA scan.  [ColonoscopyDate] : 02/14 [ColonoscopyComments] : Normal; follow up with Dr. Cavanaugh's office to schedule repeat screening. [HepatitisCDate] : 02/20 [HepatitisCComments] : Nonreactive.

## 2020-12-21 NOTE — HISTORY OF PRESENT ILLNESS
[FreeTextEntry1] : annual wellness visit [de-identified] : Patient is a 70 year old female with a past medical history as below who presents for an annual wellness visit. Patient is taking Levothyroxine Sodium and Liothyronine Sodium as prescribed and denies heat/cold intolerance on the medications. Patient is due to see GYN, Dr. Alas. Her last mammogram and breast US were in June 2020. Patient's last screening colonoscopy was in February 2014 with gastroenterologist, Dr. Cavanaugh. Patient is due for bone density testing. Patient has been maintaining a low-sodium diet given elevated BP readings during her previous visit. Patient received the flu vaccine on 9/23/20. She received the Pneumovax 23 on 11/23/20. She received the Tdap (Adacel) Vaccine on 10/7/17. She has not received the Shingles (Shingrix) Vaccine. Blood work done on 11/24/20 revealed normal CBC, normal CMP except blood-glucose (103), normal total cholesterol (196), normal triglycerides (83), low HDL (49), elevated LDL (130), elevated non-HDL cholesterol (147), elevated HGB A1C (5.7), normal thyroid panel except anti thyroid antibodies (Thyroid Peroxidase Antibody - 163.0 IU/mL), normal iron/ferritin studies, and normal vitamin D (63.5).

## 2020-12-21 NOTE — PLAN
[FreeTextEntry1] : Cardiology\par elevated BP readings on 11/23/20 - check EKG (results as above) - continue low sodium diet; will continue to monitor BP\par hypercholesterolemia - continue low cholesterol/low fat diet \par Endocrinology\par hypothyroidism - continue Levothyroxine Sodium 75mcg p.o.q.d. as directed and Liothyronine Sodium 5mcg p.o.q.d. as directed\par hyperglycemia - continue low carbohydrate/low sugar diet \par vitamin D insufficiency - continue vitamin D 2000 IU p.o.q.d. with meals as directed\par Gynecology\par Follow up with GYN, Dr. Alas for biennial visit \par Endocrinology/Musculoskeletal\par osteoporosis - Rx given for DEXA scan\par Gastroenterology\par colonoscopy - follow up with gastroenterologist, Dr. Cavanaugh's office to schedule repeat screening\par Immunization\par Pneumovax 23 administered on 11/23/20; Prevnar 13 to be administered in 1 year\par Shingrix - discussed, patient to determine if vaccine is covered under medical benefits of current insurance plan and follow up for 1st dose if interested; otherwise, instructed to follow up at the pharmacy for vaccine administration\par \par check EKG (results as above)\par Advised to RTO in 2 months for fasting blood work.

## 2021-04-07 ENCOUNTER — LABORATORY RESULT (OUTPATIENT)
Age: 71
End: 2021-04-07

## 2021-04-07 ENCOUNTER — APPOINTMENT (OUTPATIENT)
Dept: INTERNAL MEDICINE | Facility: CLINIC | Age: 71
End: 2021-04-07
Payer: MEDICARE

## 2021-04-07 VITALS
SYSTOLIC BLOOD PRESSURE: 124 MMHG | HEART RATE: 72 BPM | DIASTOLIC BLOOD PRESSURE: 72 MMHG | TEMPERATURE: 97.6 F | RESPIRATION RATE: 16 BRPM | HEIGHT: 62 IN | OXYGEN SATURATION: 98 % | BODY MASS INDEX: 21.16 KG/M2 | WEIGHT: 115 LBS

## 2021-04-07 DIAGNOSIS — Z12.11 ENCOUNTER FOR SCREENING FOR MALIGNANT NEOPLASM OF COLON: ICD-10-CM

## 2021-04-07 PROCEDURE — 36415 COLL VENOUS BLD VENIPUNCTURE: CPT

## 2021-04-07 PROCEDURE — 99214 OFFICE O/P EST MOD 30 MIN: CPT | Mod: 25

## 2021-04-07 NOTE — ASSESSMENT
[FreeTextEntry1] : Patient is a 71 year old female with a past medical history as above who presents for fasting blood work and general follow-up.\par

## 2021-04-07 NOTE — ADDENDUM
[FreeTextEntry1] : I, Rudy Rome, acted solely as scribe for Dr. Giorgi Garcia DO on this date 04/07/2021 10:10AM .\par \par All medical record entries made by the Scribe were at my, Dr. Girogi Garcia DO direction and personally dictated by me on 04/07/2021 10:10AM. I have reviewed the chart and agree that the record accurately reflects my personal performance of the history, physical exam, assessment and plan. I have also personally directed, reviewed and agreed with the chart.\par

## 2021-04-07 NOTE — HISTORY OF PRESENT ILLNESS
[FreeTextEntry1] : fasting blood work and general follow-up\par  [de-identified] : Patient is a 71 year old female with a past medical history as below who presents for fasting blood work and general follow-up. Patient is taking Levothyroxine Sodium and Liothyronine Sodium as prescribed and denies heat/cold intolerance on the medications. Patient has scheduled a screening colonoscopy with gastroenterologist, Dr. Cavanaugh for May 2021. Patient was previously given a Rx for a repeat DEXA scan.

## 2021-04-07 NOTE — PLAN
[FreeTextEntry1] : Cardiology\par hypercholesterolemia - continue low cholesterol/low fat diet - check FLP \par Endocrinology\par hypothyroidism - continue Levothyroxine Sodium 75mcg p.o.q.d. as directed and Liothyronine Sodium 5mcg p.o.q.d. as directed - check thyroid panel\par hyperglycemia - continue low carbohydrate/low sugar diet - check hemoglobin A1C\par vitamin D insufficiency - continue Vitamin D 2000 IU p.o.q.d. with meals as directed - check Vitamin D \par Endocrinology/Musculoskeletal\par osteoporosis - Rx previously given for DEXA scan\par Gastroenterology\par colonoscopy - scheduled with gastroenterologist, Dr. Cavanaugh for May 2021\par \par check female panel, hemoglobin A1C, thyroid panel, and vitamin panel

## 2021-04-07 NOTE — HEALTH RISK ASSESSMENT
[Yes] : Yes [2 - 4 times a month (2 pts)] : 2-4 times a month (2 points) [1 or 2 (0 pts)] : 1 or 2 (0 points) [Never (0 pts)] : Never (0 points) [No] : In the past 12 months have you used drugs other than those required for medical reasons? No [0] : 2) Feeling down, depressed, or hopeless: Not at all (0) [Fully functional (bathing, dressing, toileting, transferring, walking, feeding)] : Fully functional (bathing, dressing, toileting, transferring, walking, feeding) [Fully functional (using the telephone, shopping, preparing meals, housekeeping, doing laundry, using] : Fully functional and needs no help or supervision to perform IADLs (using the telephone, shopping, preparing meals, housekeeping, doing laundry, using transportation, managing medications and managing finances) [] : No [Audit-CScore] : 2 [[id number]] : 0 [MammogramDate] : 06/20 [MammogramComments] : Benign findings; no mammographic or ultrasonographic evidence of malignancy.  [BoneDensityDate] : 03/16 [BoneDensityComments] : Osteoporosis; Rx previously given for repeat DEXA scan.  [ColonoscopyDate] : 02/14 [ColonoscopyComments] : Repeat screening scheduled with gastroenterologist, Dr. Cavanaugh for May 2021. [HepatitisCDate] : 02/20 [HepatitisCComments] : Nonreactive.

## 2021-04-11 ENCOUNTER — RX RENEWAL (OUTPATIENT)
Age: 71
End: 2021-04-11

## 2021-04-12 ENCOUNTER — NON-APPOINTMENT (OUTPATIENT)
Age: 71
End: 2021-04-12

## 2021-04-12 LAB
24R-OH-CALCIDIOL SERPL-MCNC: 68.2 PG/ML
25(OH)D3 SERPL-MCNC: 79.4 NG/ML
ALBUMIN SERPL ELPH-MCNC: 4.6 G/DL
ALP BLD-CCNC: 55 U/L
ALT SERPL-CCNC: 17 U/L
ANION GAP SERPL CALC-SCNC: 11 MMOL/L
AST SERPL-CCNC: 20 U/L
BASOPHILS # BLD AUTO: 0.03 K/UL
BASOPHILS NFR BLD AUTO: 0.6 %
BILIRUB SERPL-MCNC: 0.3 MG/DL
BUN SERPL-MCNC: 14 MG/DL
CALCIUM SERPL-MCNC: 10.1 MG/DL
CHLORIDE SERPL-SCNC: 103 MMOL/L
CHOLEST SERPL-MCNC: 197 MG/DL
CO2 SERPL-SCNC: 26 MMOL/L
CREAT SERPL-MCNC: 0.63 MG/DL
EOSINOPHIL # BLD AUTO: 0.06 K/UL
EOSINOPHIL NFR BLD AUTO: 1.2 %
ESTIMATED AVERAGE GLUCOSE: 108 MG/DL
GLUCOSE SERPL-MCNC: 104 MG/DL
HBA1C MFR BLD HPLC: 5.4 %
HCT VFR BLD CALC: 45 %
HCT VFR BLD CALC: 45 %
HDLC SERPL-MCNC: 48 MG/DL
HGB BLD-MCNC: 13.7 G/DL
HGB BLD-MCNC: 13.7 G/DL
IMM GRANULOCYTES NFR BLD AUTO: 0.2 %
IRON SERPL-MCNC: 99 UG/DL
LDLC SERPL CALC-MCNC: 135 MG/DL
LYMPHOCYTES # BLD AUTO: 1.23 K/UL
LYMPHOCYTES NFR BLD AUTO: 24.8 %
MAGNESIUM SERPL-MCNC: 2 MG/DL
MAN DIFF?: NORMAL
MCHC RBC-ENTMCNC: 28.9 PG
MCHC RBC-ENTMCNC: 30.4 GM/DL
MCV RBC AUTO: 94.9 FL
MONOCYTES # BLD AUTO: 0.41 K/UL
MONOCYTES NFR BLD AUTO: 8.3 %
NEUTROPHILS # BLD AUTO: 3.21 K/UL
NEUTROPHILS NFR BLD AUTO: 64.9 %
NONHDLC SERPL-MCNC: 150 MG/DL
PLATELET # BLD AUTO: 270 K/UL
POTASSIUM SERPL-SCNC: 4.2 MMOL/L
PROT SERPL-MCNC: 7 G/DL
RBC # BLD: 4.74 M/UL
RBC # FLD: 12.7 %
SODIUM SERPL-SCNC: 141 MMOL/L
T3 SERPL-MCNC: 163 NG/DL
T3FREE SERPL-MCNC: 4.03 PG/ML
T3RU NFR SERPL: 1.1 TBI
T4 FREE SERPL-MCNC: 1.2 NG/DL
T4 SERPL-MCNC: 8 UG/DL
THYROGLOB AB SERPL-ACNC: <20 IU/ML
THYROPEROXIDASE AB SERPL IA-ACNC: 112 IU/ML
TRIGL SERPL-MCNC: 74 MG/DL
TSH SERPL-ACNC: 1.05 UIU/ML
VIT B12 SERPL-MCNC: 1020 PG/ML
VIT C SERPL-MCNC: 0.8 MG/DL
WBC # FLD AUTO: 4.95 K/UL
ZINC SERPL-MCNC: 121 UG/DL

## 2021-04-13 LAB — UBIQUINONE10 SERPL-MCNC: 2.1 UG/ML

## 2021-04-15 LAB — VIT B1 SERPL-MCNC: 144.3 NMOL/L

## 2021-04-17 LAB — VIT A SERPL-MCNC: 40.4 UG/DL

## 2021-04-23 ENCOUNTER — NON-APPOINTMENT (OUTPATIENT)
Age: 71
End: 2021-04-23

## 2021-04-27 ENCOUNTER — RESULT REVIEW (OUTPATIENT)
Age: 71
End: 2021-04-27

## 2021-05-11 RX ORDER — TURM/GING/BOS/YUC/WIL/CHAM/HOR 100-100 MG
TABLET ORAL
Refills: 0 | Status: ACTIVE | COMMUNITY

## 2021-05-11 RX ORDER — UBIDECARENONE 200 MG
400 CAPSULE ORAL
Refills: 0 | Status: ACTIVE | COMMUNITY

## 2021-05-11 RX ORDER — UBIDECARENONE 200 MG
CAPSULE ORAL
Refills: 0 | Status: ACTIVE | COMMUNITY

## 2021-05-11 RX ORDER — SELENIUM 50 MCG
TABLET ORAL
Refills: 0 | Status: ACTIVE | COMMUNITY

## 2021-05-11 RX ORDER — PRASTERONE (DHEA) 50 MG
CAPSULE ORAL
Refills: 0 | Status: ACTIVE | COMMUNITY

## 2021-05-11 RX ORDER — ADHESIVE TAPE 3"X 2.3 YD
50 MCG TAPE, NON-MEDICATED TOPICAL
Refills: 0 | Status: ACTIVE | COMMUNITY

## 2021-05-19 ENCOUNTER — APPOINTMENT (OUTPATIENT)
Dept: DISASTER EMERGENCY | Facility: CLINIC | Age: 71
End: 2021-05-19

## 2021-05-20 LAB — SARS-COV-2 N GENE NPH QL NAA+PROBE: NOT DETECTED

## 2021-05-24 ENCOUNTER — APPOINTMENT (OUTPATIENT)
Dept: COLORECTAL SURGERY | Facility: CLINIC | Age: 71
End: 2021-05-24
Payer: MEDICARE

## 2021-05-24 PROCEDURE — 45378 DIAGNOSTIC COLONOSCOPY: CPT

## 2021-07-30 ENCOUNTER — APPOINTMENT (OUTPATIENT)
Dept: INTERNAL MEDICINE | Facility: CLINIC | Age: 71
End: 2021-07-30
Payer: MEDICARE

## 2021-07-30 VITALS
RESPIRATION RATE: 16 BRPM | TEMPERATURE: 97.9 F | HEART RATE: 80 BPM | DIASTOLIC BLOOD PRESSURE: 82 MMHG | SYSTOLIC BLOOD PRESSURE: 136 MMHG | OXYGEN SATURATION: 98 % | BODY MASS INDEX: 21.62 KG/M2 | HEIGHT: 62 IN | WEIGHT: 117.5 LBS

## 2021-07-30 PROCEDURE — 36415 COLL VENOUS BLD VENIPUNCTURE: CPT

## 2021-07-30 PROCEDURE — 99214 OFFICE O/P EST MOD 30 MIN: CPT | Mod: 25

## 2021-07-30 NOTE — ADDENDUM
[FreeTextEntry1] : I, Rudy Rome, acted solely as scribe for Dr. Giorgi Garcia DO on this date 07/30/2021  1:50PM .\par \par All medical record entries made by the Scribe were at my, Dr. Giorgi Garcia DO direction and personally dictated by me on 07/30/2021  1:50PM. I have reviewed the chart and agree that the record accurately reflects my personal performance of the history, physical exam, assessment and plan. I have also personally directed, reviewed and agreed with the chart.\par

## 2021-07-30 NOTE — PLAN
[FreeTextEntry1] : Cardiology\par history of hypercholesterolemia - continue low cholesterol/low fat diet - check FLP \par Endocrinology\par hypothyroidism - continue Levothyroxine Sodium 75mcg p.o.q.d. as directed and Liothyronine Sodium 5mcg p.o.q.d. as directed \par hyperglycemia - continue low carbohydrate/low sugar diet \par Vitamin D insufficiency - continue Vitamin D 2000 IU p.o.q.d. with meals as directed  \par Gastroenterology\par Results of most recent screening colonoscopy to be requested from gastroenterologist, Dr. Cavanaugh's office\par Gynecology\par Results of most recent breast imaging to be requested from [facility]\par Infectious Disease\par S/p COVID-19 Vaccine (Moderna, x2) - check COVID-19 Aftab Domain Antibody\par \par check FLP and COVID-19 Aftab Domain Antibody

## 2021-07-30 NOTE — HISTORY OF PRESENT ILLNESS
[FreeTextEntry1] : fasting blood work and general follow-up\par  [de-identified] : Patient is a 71 year old female with a past medical history as below who presents for fasting blood work and general follow-up. Patient is taking Levothyroxine Sodium and Liothyronine Sodium as prescribed and denies heat/cold intolerance on the medications. Patient had a screening colonoscopy with gastroenterologist, Dr. Cavanaugh in May 2021. Patient notes recent Mammogram at [facility].  Blood work done on 4/7/21 revealed elevated LDL (135) and non-HDL cholesterol (150). Patient states she has been maintaining a well-balanced, low cholesterol/low fat diet. Patient has received both doses of the COVID-19 Vaccine (Moderna). She inquires about checking COVID-19 Aftab Domain Antibody level to evaluate level of protection provided by her immune response to the vaccination.

## 2021-08-05 LAB
CHOLEST SERPL-MCNC: 190 MG/DL
COVID-19 SPIKE DOMAIN ANTIBODY INTERPRETATION: POSITIVE
HDLC SERPL-MCNC: 48 MG/DL
LDLC SERPL CALC-MCNC: 129 MG/DL
NONHDLC SERPL-MCNC: 142 MG/DL
SARS-COV-2 AB SERPL IA-ACNC: >250 U/ML
TRIGL SERPL-MCNC: 67 MG/DL

## 2021-08-16 ENCOUNTER — APPOINTMENT (OUTPATIENT)
Dept: CARDIOLOGY | Facility: CLINIC | Age: 71
End: 2021-08-16
Payer: MEDICARE

## 2021-08-16 ENCOUNTER — NON-APPOINTMENT (OUTPATIENT)
Age: 71
End: 2021-08-16

## 2021-08-16 VITALS
HEIGHT: 62 IN | BODY MASS INDEX: 21.53 KG/M2 | HEART RATE: 70 BPM | WEIGHT: 117 LBS | SYSTOLIC BLOOD PRESSURE: 134 MMHG | OXYGEN SATURATION: 98 % | DIASTOLIC BLOOD PRESSURE: 78 MMHG

## 2021-08-16 VITALS — DIASTOLIC BLOOD PRESSURE: 76 MMHG | HEART RATE: 66 BPM | OXYGEN SATURATION: 95 % | SYSTOLIC BLOOD PRESSURE: 129 MMHG

## 2021-08-16 DIAGNOSIS — R07.89 OTHER CHEST PAIN: ICD-10-CM

## 2021-08-16 DIAGNOSIS — Z01.818 ENCOUNTER FOR OTHER PREPROCEDURAL EXAMINATION: ICD-10-CM

## 2021-08-16 PROCEDURE — 99204 OFFICE O/P NEW MOD 45 MIN: CPT

## 2021-08-16 PROCEDURE — 93000 ELECTROCARDIOGRAM COMPLETE: CPT

## 2021-09-08 ENCOUNTER — APPOINTMENT (OUTPATIENT)
Dept: CARDIOLOGY | Facility: CLINIC | Age: 71
End: 2021-09-08

## 2021-09-14 ENCOUNTER — APPOINTMENT (OUTPATIENT)
Dept: CARDIOLOGY | Facility: CLINIC | Age: 71
End: 2021-09-14
Payer: MEDICARE

## 2021-09-14 PROCEDURE — 93306 TTE W/DOPPLER COMPLETE: CPT

## 2021-09-14 PROCEDURE — 93880 EXTRACRANIAL BILAT STUDY: CPT

## 2021-09-22 ENCOUNTER — APPOINTMENT (OUTPATIENT)
Dept: CARDIOLOGY | Facility: CLINIC | Age: 71
End: 2021-09-22
Payer: MEDICARE

## 2021-09-22 PROCEDURE — 93015 CV STRESS TEST SUPVJ I&R: CPT

## 2021-09-29 ENCOUNTER — RX RENEWAL (OUTPATIENT)
Age: 71
End: 2021-09-29

## 2021-11-15 ENCOUNTER — NON-APPOINTMENT (OUTPATIENT)
Age: 71
End: 2021-11-15

## 2021-11-15 ENCOUNTER — APPOINTMENT (OUTPATIENT)
Dept: CARDIOLOGY | Facility: CLINIC | Age: 71
End: 2021-11-15
Payer: MEDICARE

## 2021-11-15 VITALS
BODY MASS INDEX: 21.71 KG/M2 | OXYGEN SATURATION: 98 % | SYSTOLIC BLOOD PRESSURE: 131 MMHG | HEART RATE: 80 BPM | HEIGHT: 62 IN | WEIGHT: 118 LBS | DIASTOLIC BLOOD PRESSURE: 77 MMHG

## 2021-11-15 DIAGNOSIS — R06.00 DYSPNEA, UNSPECIFIED: ICD-10-CM

## 2021-11-15 PROCEDURE — 93000 ELECTROCARDIOGRAM COMPLETE: CPT

## 2021-11-15 PROCEDURE — 99215 OFFICE O/P EST HI 40 MIN: CPT

## 2021-12-07 ENCOUNTER — MED ADMIN CHARGE (OUTPATIENT)
Age: 71
End: 2021-12-07

## 2021-12-07 ENCOUNTER — APPOINTMENT (OUTPATIENT)
Dept: INTERNAL MEDICINE | Facility: CLINIC | Age: 71
End: 2021-12-07
Payer: MEDICARE

## 2021-12-07 VITALS
HEART RATE: 84 BPM | TEMPERATURE: 98.2 F | OXYGEN SATURATION: 98 % | WEIGHT: 120 LBS | DIASTOLIC BLOOD PRESSURE: 70 MMHG | SYSTOLIC BLOOD PRESSURE: 142 MMHG | HEIGHT: 62 IN | RESPIRATION RATE: 16 BRPM | BODY MASS INDEX: 22.08 KG/M2

## 2021-12-07 DIAGNOSIS — Z01.84 ENCOUNTER FOR ANTIBODY RESPONSE EXAMINATION: ICD-10-CM

## 2021-12-07 PROCEDURE — 90732 PPSV23 VACC 2 YRS+ SUBQ/IM: CPT

## 2021-12-07 PROCEDURE — 36415 COLL VENOUS BLD VENIPUNCTURE: CPT

## 2021-12-07 PROCEDURE — G0009: CPT

## 2021-12-07 PROCEDURE — 99214 OFFICE O/P EST MOD 30 MIN: CPT | Mod: 25

## 2021-12-08 NOTE — HEALTH RISK ASSESSMENT
[Yes] : Yes [2 - 4 times a month (2 pts)] : 2-4 times a month (2 points) [1 or 2 (0 pts)] : 1 or 2 (0 points) [Never (0 pts)] : Never (0 points) [No] : In the past 12 months have you used drugs other than those required for medical reasons? No [0] : 2) Feeling down, depressed, or hopeless: Not at all (0) [PHQ-2 Negative - No further assessment needed] : PHQ-2 Negative - No further assessment needed [Fully functional (bathing, dressing, toileting, transferring, walking, feeding)] : Fully functional (bathing, dressing, toileting, transferring, walking, feeding) [Fully functional (using the telephone, shopping, preparing meals, housekeeping, doing laundry, using] : Fully functional and needs no help or supervision to perform IADLs (using the telephone, shopping, preparing meals, housekeeping, doing laundry, using transportation, managing medications and managing finances) [] : No [Audit-CScore] : 2 [[id number]] : 0 [MammogramDate] : 06/20 [MammogramComments] : Benign findings; no mammographic or ultrasonographic evidence of malignancy; Results of most recent imaging to be requested from [facility]. [BoneDensityDate] : 04/21 [BoneDensityComments] : Osteoporosis.  [ColonoscopyDate] : 02/14 [ColonoscopyComments] : Results of most recent screening to be requested from gastroenterologist, Dr. Cavanaugh's office. [HepatitisCDate] : 02/20 [HepatitisCComments] : Nonreactive.

## 2021-12-08 NOTE — PHYSICAL EXAM
[No Acute Distress] : no acute distress [Well Nourished] : well nourished [Well Developed] : well developed [Well-Appearing] : well-appearing [Normal Sclera/Conjunctiva] : normal sclera/conjunctiva [PERRL] : pupils equal round and reactive to light [EOMI] : extraocular movements intact [Normal Outer Ear/Nose] : the outer ears and nose were normal in appearance [Normal Oropharynx] : the oropharynx was normal [No JVD] : no jugular venous distention [No Lymphadenopathy] : no lymphadenopathy [Supple] : supple [Thyroid Normal, No Nodules] : the thyroid was normal and there were no nodules present [No Respiratory Distress] : no respiratory distress  [No Accessory Muscle Use] : no accessory muscle use [Clear to Auscultation] : lungs were clear to auscultation bilaterally [Normal Rate] : normal rate  [Regular Rhythm] : with a regular rhythm [Normal S1, S2] : normal S1 and S2 [No Murmur] : no murmur heard [No Carotid Bruits] : no carotid bruits [No Abdominal Bruit] : a ~M bruit was not heard ~T in the abdomen [No Varicosities] : no varicosities [Pedal Pulses Present] : the pedal pulses are present [No Edema] : there was no peripheral edema [No Palpable Aorta] : no palpable aorta [No Extremity Clubbing/Cyanosis] : no extremity clubbing/cyanosis [Soft] : abdomen soft [Non Tender] : non-tender [Non-distended] : non-distended [No Masses] : no abdominal mass palpated [No HSM] : no HSM [Normal Bowel Sounds] : normal bowel sounds [Normal Posterior Cervical Nodes] : no posterior cervical lymphadenopathy [Normal Anterior Cervical Nodes] : no anterior cervical lymphadenopathy [No CVA Tenderness] : no CVA  tenderness [No Spinal Tenderness] : no spinal tenderness [No Joint Swelling] : no joint swelling [Grossly Normal Strength/Tone] : grossly normal strength/tone [No Rash] : no rash [Coordination Grossly Intact] : coordination grossly intact [No Focal Deficits] : no focal deficits [Normal Gait] : normal gait [Deep Tendon Reflexes (DTR)] : deep tendon reflexes were 2+ and symmetric [Normal Affect] : the affect was normal [Normal Insight/Judgement] : insight and judgment were intact [Speech Grossly Normal] : speech grossly normal [Normal Mood] : the mood was normal

## 2021-12-08 NOTE — HISTORY OF PRESENT ILLNESS
[FreeTextEntry1] : Fasting labs  [de-identified] : Mrs. PAZ is a 71 year  female, who present to the office for fasting labs.   General feels well - no new c/o \par Denies chest pain, SOB, TOLENTINO \par Denies getting a flu shot -   \par Denies covid booster shot

## 2021-12-08 NOTE — PLAN
[FreeTextEntry1] : Cardiology\par history of hypercholesterolemia - continue low cholesterol/low fat diet - check FLP - goal to have LDL less than 100-\par Endocrinology\par hypothyroidism - continue Levothyroxine Sodium 75mcg p.o.q.d. as directed and Liothyronine Sodium 5mcg p.o.q.d. as directed - Check TSH \par hyperglycemia - continue low carbohydrate/low sugar diet - Check a1c \par Vitamin D insufficiency - continue Vitamin D 2000 IU p.o.q.d. with meals as directed  \par \par S/p COVID-19 Vaccine (Moderna, x2) - check COVID-19 Aftab Domain Antibody\par Advised covid booster  Discussed the importance of the booster shot  in the prevention of disease  \par \par Immunization  PCV 13 given today - consent given   no reaction noted \par check FLP and COVID-19 Aftab Domain Antibody

## 2021-12-13 LAB
25(OH)D3 SERPL-MCNC: 60.1 NG/ML
ALBUMIN SERPL ELPH-MCNC: 4.5 G/DL
ALP BLD-CCNC: 67 U/L
ALT SERPL-CCNC: 21 U/L
ANION GAP SERPL CALC-SCNC: 9 MMOL/L
AST SERPL-CCNC: 19 U/L
BASOPHILS # BLD AUTO: 0.05 K/UL
BASOPHILS NFR BLD AUTO: 1.1 %
BILIRUB SERPL-MCNC: 0.3 MG/DL
BUN SERPL-MCNC: 15 MG/DL
CALCIUM SERPL-MCNC: 9.7 MG/DL
CHLORIDE SERPL-SCNC: 105 MMOL/L
CHOLEST SERPL-MCNC: 174 MG/DL
CO2 SERPL-SCNC: 29 MMOL/L
COVID-19 SPIKE DOMAIN ANTIBODY INTERPRETATION: POSITIVE
CREAT SERPL-MCNC: 0.65 MG/DL
EOSINOPHIL # BLD AUTO: 0.1 K/UL
EOSINOPHIL NFR BLD AUTO: 2.2 %
ESTIMATED AVERAGE GLUCOSE: 117 MG/DL
GLUCOSE SERPL-MCNC: 115 MG/DL
HBA1C MFR BLD HPLC: 5.7 %
HCT VFR BLD CALC: 44.3 %
HDLC SERPL-MCNC: 45 MG/DL
HGB BLD-MCNC: 13.5 G/DL
IMM GRANULOCYTES NFR BLD AUTO: 0.4 %
LDLC SERPL CALC-MCNC: 116 MG/DL
LYMPHOCYTES # BLD AUTO: 1.3 K/UL
LYMPHOCYTES NFR BLD AUTO: 29.1 %
MAN DIFF?: NORMAL
MCHC RBC-ENTMCNC: 29.1 PG
MCHC RBC-ENTMCNC: 30.5 GM/DL
MCV RBC AUTO: 95.5 FL
MONOCYTES # BLD AUTO: 0.44 K/UL
MONOCYTES NFR BLD AUTO: 9.8 %
NEUTROPHILS # BLD AUTO: 2.56 K/UL
NEUTROPHILS NFR BLD AUTO: 57.4 %
NONHDLC SERPL-MCNC: 129 MG/DL
PLATELET # BLD AUTO: 273 K/UL
POTASSIUM SERPL-SCNC: 4.4 MMOL/L
PROT SERPL-MCNC: 6.5 G/DL
RBC # BLD: 4.64 M/UL
RBC # FLD: 13.2 %
SARS-COV-2 AB SERPL IA-ACNC: >250 U/ML
SODIUM SERPL-SCNC: 144 MMOL/L
T4 FREE SERPL-MCNC: 1.1 NG/DL
TRIGL SERPL-MCNC: 67 MG/DL
TSH SERPL-ACNC: 1.35 UIU/ML
WBC # FLD AUTO: 4.47 K/UL

## 2022-04-07 ENCOUNTER — APPOINTMENT (OUTPATIENT)
Dept: UROLOGY | Facility: CLINIC | Age: 72
End: 2022-04-07
Payer: MEDICARE

## 2022-04-07 PROCEDURE — 52000 CYSTOURETHROSCOPY: CPT

## 2022-04-08 ENCOUNTER — APPOINTMENT (OUTPATIENT)
Dept: INTERNAL MEDICINE | Facility: CLINIC | Age: 72
End: 2022-04-08
Payer: MEDICARE

## 2022-04-08 ENCOUNTER — LABORATORY RESULT (OUTPATIENT)
Age: 72
End: 2022-04-08

## 2022-04-08 VITALS
OXYGEN SATURATION: 98 % | HEIGHT: 62 IN | HEART RATE: 77 BPM | BODY MASS INDEX: 21.71 KG/M2 | RESPIRATION RATE: 16 BRPM | TEMPERATURE: 97 F | SYSTOLIC BLOOD PRESSURE: 140 MMHG | WEIGHT: 118 LBS | DIASTOLIC BLOOD PRESSURE: 80 MMHG

## 2022-04-08 DIAGNOSIS — D49.4 NEOPLASM OF UNSPECIFIED BEHAVIOR OF BLADDER: ICD-10-CM

## 2022-04-08 PROCEDURE — 36415 COLL VENOUS BLD VENIPUNCTURE: CPT

## 2022-04-08 PROCEDURE — 99214 OFFICE O/P EST MOD 30 MIN: CPT | Mod: CS,25

## 2022-04-10 PROBLEM — D49.4 BLADDER TUMOR: Status: ACTIVE | Noted: 2017-04-20

## 2022-04-10 NOTE — PHYSICAL EXAM
[No Acute Distress] : no acute distress [Well Nourished] : well nourished [Well Developed] : well developed [Well-Appearing] : well-appearing [Normal Sclera/Conjunctiva] : normal sclera/conjunctiva [PERRL] : pupils equal round and reactive to light [EOMI] : extraocular movements intact [Normal Outer Ear/Nose] : the outer ears and nose were normal in appearance [Normal Oropharynx] : the oropharynx was normal [No JVD] : no jugular venous distention [No Lymphadenopathy] : no lymphadenopathy [Supple] : supple [Thyroid Normal, No Nodules] : the thyroid was normal and there were no nodules present [No Respiratory Distress] : no respiratory distress  [No Accessory Muscle Use] : no accessory muscle use [Clear to Auscultation] : lungs were clear to auscultation bilaterally [Normal Rate] : normal rate  [Regular Rhythm] : with a regular rhythm [Normal S1, S2] : normal S1 and S2 [No Murmur] : no murmur heard [No Carotid Bruits] : no carotid bruits [No Abdominal Bruit] : a ~M bruit was not heard ~T in the abdomen [No Varicosities] : no varicosities [Pedal Pulses Present] : the pedal pulses are present [No Edema] : there was no peripheral edema [No Palpable Aorta] : no palpable aorta [No Extremity Clubbing/Cyanosis] : no extremity clubbing/cyanosis [Soft] : abdomen soft [Non Tender] : non-tender [Non-distended] : non-distended [No Masses] : no abdominal mass palpated [No HSM] : no HSM [Normal Bowel Sounds] : normal bowel sounds [Normal Posterior Cervical Nodes] : no posterior cervical lymphadenopathy [Normal Anterior Cervical Nodes] : no anterior cervical lymphadenopathy [No CVA Tenderness] : no CVA  tenderness [No Spinal Tenderness] : no spinal tenderness [No Joint Swelling] : no joint swelling [Grossly Normal Strength/Tone] : grossly normal strength/tone [No Rash] : no rash [Coordination Grossly Intact] : coordination grossly intact [No Focal Deficits] : no focal deficits [Normal Gait] : normal gait [Deep Tendon Reflexes (DTR)] : deep tendon reflexes were 2+ and symmetric [Normal Affect] : the affect was normal [Normal Insight/Judgement] : insight and judgment were intact [Speech Grossly Normal] : speech grossly normal [Memory Grossly Normal] : memory grossly normal [Alert and Oriented x3] : oriented to person, place, and time [Normal Mood] : the mood was normal

## 2022-04-10 NOTE — HISTORY OF PRESENT ILLNESS
[FreeTextEntry1] : fasting blood work and general follow-up\par  [de-identified] : Patient is a 72 year old female with a past medical history as below who presents for fasting blood work and general follow-up. Patient is taking Levothyroxine Sodium and Liothyronine Sodium as prescribed and denies heat or cold intolerance on the medications. Patient is due for annual breast imaging. Patient's last screening colonoscopy was in May 2021 with gastroenterologist, Dr. Cavanaugh. Patient sees urologist, Dr. Keller given history of bladder cancer; notes recent cystoscopy. Patient has received 2 doses of the COVID-19 Vaccine (Moderna). She inquires about testing for COVID-19 antibodies with blood work today.

## 2022-04-10 NOTE — PLAN
[FreeTextEntry1] : Cardiology\par history of hypercholesterolemia - continue low cholesterol/low fat diet - check FLP \par Endocrinology\par hypothyroidism - continue Levothyroxine Sodium 75mcg p.o.q.d. as directed and Liothyronine Sodium 5mcg p.o.q.d. as directed - check thyroid panel\par hyperglycemia - continue low carbohydrate/low sugar diet - check hemoglobin A1C\par Vitamin D insufficiency - continue Vitamin D 2000 IU p.o.q.d. with a meal as directed - check Vitamin D \par Gynecology\par Rx given for Mammogram and Bilateral Breast US\par Immunization/Infectious Disease\par S/p COVID-19 Vaccine (Moderna, x2) - check COVID-19 Aftab Domain Antibody - advised to follow up at pharmacy for booster dose of vaccine\par Check COVID-19 Nucleocapsid Antibody\par \par check FLP, hemoglobin A1C, thyroid panel, Vitamin D, COVID-19 Aftab Domain Antibody, and COVID-19 Nucleocapsid Antibody

## 2022-04-10 NOTE — ADDENDUM
[FreeTextEntry1] : I, Rudy Rome, acted solely as scribe for Dr. Giorgi Garcia DO on this date 04/08/2022 10:40AM .\par \par All medical record entries made by the Scribe were at my, Dr. Giorgi Garcia DO direction and personally dictated by me on 04/08/2022 10:40AM. I have reviewed the chart and agree that the record accurately reflects my personal performance of the history, physical exam, assessment and plan. I have also personally directed, reviewed and agreed with the chart.\par

## 2022-04-10 NOTE — HEALTH RISK ASSESSMENT
[Former] : Former [Yes] : Yes [2 - 4 times a month (2 pts)] : 2-4 times a month (2 points) [1 or 2 (0 pts)] : 1 or 2 (0 points) [Never (0 pts)] : Never (0 points) [No] : In the past 12 months have you used drugs other than those required for medical reasons? No [0] : 2) Feeling down, depressed, or hopeless: Not at all (0) [PHQ-2 Negative - No further assessment needed] : PHQ-2 Negative - No further assessment needed [Fully functional (bathing, dressing, toileting, transferring, walking, feeding)] : Fully functional (bathing, dressing, toileting, transferring, walking, feeding) [Fully functional (using the telephone, shopping, preparing meals, housekeeping, doing laundry, using] : Fully functional and needs no help or supervision to perform IADLs (using the telephone, shopping, preparing meals, housekeeping, doing laundry, using transportation, managing medications and managing finances) [No falls in past year] : Patient reported no falls in the past year [Audit-CScore] : 2 [[id number]] : 0 [MammogramDate] : 06/20 [MammogramComments] : Benign findings; no mammographic or ultrasonographic evidence of malignancy; Rx given for Mammogram and Bilateral Breast US. [BoneDensityDate] : 04/21 [BoneDensityComments] : Osteoporosis.  [ColonoscopyDate] : 05/21 [HepatitisCDate] : 02/20 [ColonoscopyComments] : Normal; repeat in 2026. [HepatitisCComments] : Nonreactive.

## 2022-04-10 NOTE — ASSESSMENT
[FreeTextEntry1] : Patient is a 72 year old female with a past medical history as above who presents for fasting blood work and general follow-up.\par

## 2022-04-13 ENCOUNTER — NON-APPOINTMENT (OUTPATIENT)
Age: 72
End: 2022-04-13

## 2022-04-13 LAB
25(OH)D3 SERPL-MCNC: 66.6 NG/ML
CHOLEST SERPL-MCNC: 190 MG/DL
COVID-19 NUCLEOCAPSID  GAM ANTIBODY INTERPRETATION: NEGATIVE
COVID-19 SPIKE DOMAIN ANTIBODY INTERPRETATION: POSITIVE
ESTIMATED AVERAGE GLUCOSE: 117 MG/DL
HBA1C MFR BLD HPLC: 5.7 %
HDLC SERPL-MCNC: 47 MG/DL
LDLC SERPL CALC-MCNC: 125 MG/DL
NONHDLC SERPL-MCNC: 143 MG/DL
SARS-COV-2 AB SERPL IA-ACNC: >250 U/ML
SARS-COV-2 AB SERPL QL IA: 0.07 INDEX
T3 SERPL-MCNC: 159 NG/DL
T3FREE SERPL-MCNC: 4.02 PG/ML
T3RU NFR SERPL: 1 TBI
T4 FREE SERPL-MCNC: 1.3 NG/DL
T4 SERPL-MCNC: 8.6 UG/DL
THYROGLOB AB SERPL-ACNC: <20 IU/ML
THYROPEROXIDASE AB SERPL IA-ACNC: 150 IU/ML
TRIGL SERPL-MCNC: 93 MG/DL
TSH SERPL-ACNC: 0.93 UIU/ML

## 2022-04-27 LAB — URINE CYTOLOGY: NORMAL

## 2022-06-06 DIAGNOSIS — R92.8 OTHER ABNORMAL AND INCONCLUSIVE FINDINGS ON DIAGNOSTIC IMAGING OF BREAST: ICD-10-CM

## 2022-06-08 ENCOUNTER — NON-APPOINTMENT (OUTPATIENT)
Age: 72
End: 2022-06-08

## 2022-06-08 PROBLEM — R92.8 ABNORMAL ULTRASOUND OF BREAST: Status: ACTIVE | Noted: 2022-06-08

## 2022-09-15 ENCOUNTER — APPOINTMENT (OUTPATIENT)
Dept: INTERNAL MEDICINE | Facility: CLINIC | Age: 72
End: 2022-09-15

## 2022-09-15 VITALS
SYSTOLIC BLOOD PRESSURE: 128 MMHG | TEMPERATURE: 97.3 F | BODY MASS INDEX: 22.68 KG/M2 | RESPIRATION RATE: 16 BRPM | DIASTOLIC BLOOD PRESSURE: 80 MMHG | OXYGEN SATURATION: 98 % | HEIGHT: 62 IN | HEART RATE: 88 BPM | WEIGHT: 123.25 LBS

## 2022-09-15 PROCEDURE — 99213 OFFICE O/P EST LOW 20 MIN: CPT

## 2022-09-16 ENCOUNTER — LABORATORY RESULT (OUTPATIENT)
Age: 72
End: 2022-09-16

## 2022-09-16 NOTE — DATA REVIEWED
[No studies available for review at this time.] : No studies available for review at this time. [FreeTextEntry1] : Reviewed prior blood test that was abnormal A1c was elevated at 5.7 on April 7, 2022.Thyroid panel was normal.  Vitamin D was normal.\par LDL was slightly elevated at 125.

## 2022-09-16 NOTE — HEALTH RISK ASSESSMENT
[Former] : Former [Yes] : Yes [2 - 4 times a month (2 pts)] : 2-4 times a month (2 points) [1 or 2 (0 pts)] : 1 or 2 (0 points) [Never (0 pts)] : Never (0 points) [No] : In the past 12 months have you used drugs other than those required for medical reasons? No [No falls in past year] : Patient reported no falls in the past year [0] : 2) Feeling down, depressed, or hopeless: Not at all (0) [PHQ-2 Negative - No further assessment needed] : PHQ-2 Negative - No further assessment needed [Fully functional (bathing, dressing, toileting, transferring, walking, feeding)] : Fully functional (bathing, dressing, toileting, transferring, walking, feeding) [Fully functional (using the telephone, shopping, preparing meals, housekeeping, doing laundry, using] : Fully functional and needs no help or supervision to perform IADLs (using the telephone, shopping, preparing meals, housekeeping, doing laundry, using transportation, managing medications and managing finances) [Audit-CScore] : 2 [[id number]] : 0 [MammogramDate] : 06/20 [MammogramComments] : Benign findings; no mammographic or ultrasonographic evidence of malignancy; Rx given for Mammogram and Bilateral Breast US. [BoneDensityDate] : 04/21 [BoneDensityComments] : Osteoporosis.  [ColonoscopyDate] : 05/21 [ColonoscopyComments] : Normal; repeat in 2026. [HepatitisCDate] : 02/20 [HepatitisCComments] : Nonreactive.

## 2022-09-16 NOTE — PLAN
[FreeTextEntry1] : Cardiology\par history of hypercholesterolemia - continue low cholesterol/low fat diet - check FLP \par \par Endocrinology\par hypothyroidism - continue Levothyroxine Sodium 75mcg p.o.q.d. as directed and Liothyronine Sodium 5mcg p.o.q.d. as directed - check TSH and free  t4\par hyperglycemia - continue low carbohydrate/low sugar diet - check hemoglobin A1C\par Vitamin D insufficiency - continue Vitamin D 2000 IU p.o.q.d. with a meal as directed - check Vitamin D \par \par Hyperglycemia/elevated A1c at 5.7.  Advised ADA diet.  Check hemoglobin A1c.  Encourage exercise.\par Patient was given a prescription for fasting blood work.\par \par I spent 25 Minutes with the patient, half of which we discussed finding on physical exam  and coordinated care.  As well as reviewed my plans and follow ups. \par \par

## 2022-09-16 NOTE — COUNSELING
[Benefits of weight loss discussed] : Benefits of weight loss discussed [Decrease Portions] : decrease portions

## 2022-09-16 NOTE — PHYSICAL EXAM
[No Acute Distress] : no acute distress [Well Nourished] : well nourished [Well Developed] : well developed [Well-Appearing] : well-appearing [Normal Sclera/Conjunctiva] : normal sclera/conjunctiva [PERRL] : pupils equal round and reactive to light [EOMI] : extraocular movements intact [Normal Outer Ear/Nose] : the outer ears and nose were normal in appearance [Normal Oropharynx] : the oropharynx was normal [No JVD] : no jugular venous distention [No Lymphadenopathy] : no lymphadenopathy [Supple] : supple [Thyroid Normal, No Nodules] : the thyroid was normal and there were no nodules present [No Respiratory Distress] : no respiratory distress  [No Accessory Muscle Use] : no accessory muscle use [Clear to Auscultation] : lungs were clear to auscultation bilaterally [Normal Rate] : normal rate  [Regular Rhythm] : with a regular rhythm [Normal S1, S2] : normal S1 and S2 [No Carotid Bruits] : no carotid bruits [No Abdominal Bruit] : a ~M bruit was not heard ~T in the abdomen [Pedal Pulses Present] : the pedal pulses are present [No Edema] : there was no peripheral edema [No Palpable Aorta] : no palpable aorta [No Extremity Clubbing/Cyanosis] : no extremity clubbing/cyanosis [Soft] : abdomen soft [Non Tender] : non-tender [Non-distended] : non-distended [No Masses] : no abdominal mass palpated [No HSM] : no HSM [Normal Bowel Sounds] : normal bowel sounds [Normal Posterior Cervical Nodes] : no posterior cervical lymphadenopathy [Normal Anterior Cervical Nodes] : no anterior cervical lymphadenopathy [No CVA Tenderness] : no CVA  tenderness [No Spinal Tenderness] : no spinal tenderness [No Joint Swelling] : no joint swelling [Grossly Normal Strength/Tone] : grossly normal strength/tone [No Rash] : no rash [Coordination Grossly Intact] : coordination grossly intact [No Focal Deficits] : no focal deficits [Normal Gait] : normal gait [Deep Tendon Reflexes (DTR)] : deep tendon reflexes were 2+ and symmetric [Speech Grossly Normal] : speech grossly normal [Memory Grossly Normal] : memory grossly normal [Normal Affect] : the affect was normal [Alert and Oriented x3] : oriented to person, place, and time [Normal Mood] : the mood was normal [Normal Insight/Judgement] : insight and judgment were intact [Normal TMs] : both tympanic membranes were normal [Declined Breast Exam] : declined breast exam  [de-identified] : As per gynecology

## 2022-09-16 NOTE — HISTORY OF PRESENT ILLNESS
[FreeTextEntry1] : fasting blood work and general follow-up\par  [de-identified] : Khushi is a 72-year-old female, with a past medical history as noted below, presents to the office for a prescription for fasting blood work and a general checkup.Patient states she takes her levothyroxine on a daily basis without any side effects.\par Patient states she generally feels well no significant medical changes since her prior visit.

## 2022-09-28 ENCOUNTER — NON-APPOINTMENT (OUTPATIENT)
Age: 72
End: 2022-09-28

## 2022-10-08 ENCOUNTER — RX RENEWAL (OUTPATIENT)
Age: 72
End: 2022-10-08

## 2022-10-18 ENCOUNTER — APPOINTMENT (OUTPATIENT)
Dept: CARDIOLOGY | Facility: CLINIC | Age: 72
End: 2022-10-18

## 2022-10-18 ENCOUNTER — NON-APPOINTMENT (OUTPATIENT)
Age: 72
End: 2022-10-18

## 2022-10-18 VITALS
WEIGHT: 117 LBS | HEIGHT: 62 IN | HEART RATE: 78 BPM | DIASTOLIC BLOOD PRESSURE: 76 MMHG | SYSTOLIC BLOOD PRESSURE: 132 MMHG | BODY MASS INDEX: 21.53 KG/M2 | OXYGEN SATURATION: 96 %

## 2022-10-18 PROCEDURE — 99215 OFFICE O/P EST HI 40 MIN: CPT

## 2022-10-18 PROCEDURE — 93000 ELECTROCARDIOGRAM COMPLETE: CPT

## 2022-12-09 ENCOUNTER — NON-APPOINTMENT (OUTPATIENT)
Age: 72
End: 2022-12-09

## 2022-12-13 ENCOUNTER — LABORATORY RESULT (OUTPATIENT)
Age: 72
End: 2022-12-13

## 2022-12-13 ENCOUNTER — APPOINTMENT (OUTPATIENT)
Dept: INTERNAL MEDICINE | Facility: CLINIC | Age: 72
End: 2022-12-13

## 2022-12-13 VITALS
DIASTOLIC BLOOD PRESSURE: 72 MMHG | SYSTOLIC BLOOD PRESSURE: 134 MMHG | HEIGHT: 62 IN | BODY MASS INDEX: 21.9 KG/M2 | TEMPERATURE: 98 F | WEIGHT: 119 LBS | OXYGEN SATURATION: 98 % | RESPIRATION RATE: 17 BRPM | HEART RATE: 80 BPM

## 2022-12-13 DIAGNOSIS — Z20.822 CONTACT WITH AND (SUSPECTED) EXPOSURE TO COVID-19: ICD-10-CM

## 2022-12-13 DIAGNOSIS — Z79.899 OTHER LONG TERM (CURRENT) DRUG THERAPY: ICD-10-CM

## 2022-12-13 PROCEDURE — 36415 COLL VENOUS BLD VENIPUNCTURE: CPT

## 2022-12-13 PROCEDURE — 99214 OFFICE O/P EST MOD 30 MIN: CPT | Mod: 25

## 2022-12-13 NOTE — HEALTH RISK ASSESSMENT
[Former] : Former [Yes] : Yes [2 - 4 times a month (2 pts)] : 2-4 times a month (2 points) [1 or 2 (0 pts)] : 1 or 2 (0 points) [Never (0 pts)] : Never (0 points) [No] : In the past 12 months have you used drugs other than those required for medical reasons? No [No falls in past year] : Patient reported no falls in the past year [0] : 2) Feeling down, depressed, or hopeless: Not at all (0) [PHQ-2 Negative - No further assessment needed] : PHQ-2 Negative - No further assessment needed [Fully functional (bathing, dressing, toileting, transferring, walking, feeding)] : Fully functional (bathing, dressing, toileting, transferring, walking, feeding) [Fully functional (using the telephone, shopping, preparing meals, housekeeping, doing laundry, using] : Fully functional and needs no help or supervision to perform IADLs (using the telephone, shopping, preparing meals, housekeeping, doing laundry, using transportation, managing medications and managing finances) [Audit-CScore] : 2 [[id number]] : 0 [MammogramDate] : 06/22 [MammogramComments] : Mammogram: BI-RADS 1: Negative; Breast US: BI-RADS 3: Probably benign findings; Right Breast US (12/8/22): BI-RADS 1: Negative. [BoneDensityDate] : 04/21 [BoneDensityComments] : Osteoporosis.  [ColonoscopyDate] : 05/21 [ColonoscopyComments] : Normal; repeat in 2026. [HepatitisCDate] : 02/20 [HepatitisCComments] : Nonreactive.

## 2022-12-13 NOTE — ADDENDUM
[FreeTextEntry1] : I, Rudy Rome, acted solely as scribe for Dr. Giorgi Garcia, DO this date 12/13/2022 12:20PM .\par \par All medical record entries made by the Scribe were at my, Dr. Giorgi Garcia, DO direction and personally dictated by me on 12/13/2022 12:20PM. I have reviewed the chart and agree that the record accurately reflects my personal performance of the history, physical exam, assessment and plan. I have also personally directed, reviewed and agreed with the chart.

## 2022-12-13 NOTE — HISTORY OF PRESENT ILLNESS
[FreeTextEntry1] : fasting blood work and general follow-up\par  [de-identified] : Patient is a 72 year old female with a past medical history as below who presents for fasting blood work and general follow-up. \par \par Patient states she is taking all medications as prescribed and denies any adverse reactions or side effects. She denies any new arthralgias or myalgias on Rosuvastatin Calcium. She denies heat/cold intolerance on Levothyroxine Sodium and Liothyronine Sodium. She was started on rosuvastatin 5 mg daily by her cardiologist, Dr. Soto. \par \par Patient inquires about testing for COVID-19 antibodies with blood work today.

## 2022-12-13 NOTE — PHYSICAL EXAM
[No Acute Distress] : no acute distress [Well Nourished] : well nourished [Well Developed] : well developed [Well-Appearing] : well-appearing [Normal Sclera/Conjunctiva] : normal sclera/conjunctiva [PERRL] : pupils equal round and reactive to light [EOMI] : extraocular movements intact [Normal Outer Ear/Nose] : the outer ears and nose were normal in appearance [Normal Oropharynx] : the oropharynx was normal [No JVD] : no jugular venous distention [No Lymphadenopathy] : no lymphadenopathy [Supple] : supple [Thyroid Normal, No Nodules] : the thyroid was normal and there were no nodules present [No Respiratory Distress] : no respiratory distress  [No Accessory Muscle Use] : no accessory muscle use [Clear to Auscultation] : lungs were clear to auscultation bilaterally [Normal Rate] : normal rate  [Regular Rhythm] : with a regular rhythm [Normal S1, S2] : normal S1 and S2 [No Murmur] : no murmur heard [No Carotid Bruits] : no carotid bruits [No Abdominal Bruit] : a ~M bruit was not heard ~T in the abdomen [No Varicosities] : no varicosities [Pedal Pulses Present] : the pedal pulses are present [No Edema] : there was no peripheral edema [No Palpable Aorta] : no palpable aorta [No Extremity Clubbing/Cyanosis] : no extremity clubbing/cyanosis [Soft] : abdomen soft [Non Tender] : non-tender [Non-distended] : non-distended [No Masses] : no abdominal mass palpated [No HSM] : no HSM [Normal Bowel Sounds] : normal bowel sounds [Normal Posterior Cervical Nodes] : no posterior cervical lymphadenopathy [Normal Anterior Cervical Nodes] : no anterior cervical lymphadenopathy [No CVA Tenderness] : no CVA  tenderness [No Spinal Tenderness] : no spinal tenderness [No Joint Swelling] : no joint swelling [Grossly Normal Strength/Tone] : grossly normal strength/tone [No Rash] : no rash [Coordination Grossly Intact] : coordination grossly intact [No Focal Deficits] : no focal deficits [Normal Gait] : normal gait [Deep Tendon Reflexes (DTR)] : deep tendon reflexes were 2+ and symmetric [Normal Affect] : the affect was normal [Normal Insight/Judgement] : insight and judgment were intact [Normal Voice/Communication] : normal voice/communication [Normal TMs] : both tympanic membranes were normal [Normal Nasal Mucosa] : the nasal mucosa was normal [Normal Supraclavicular Nodes] : no supraclavicular lymphadenopathy [Kyphosis] : no kyphosis [Scoliosis] : no scoliosis [Speech Grossly Normal] : speech grossly normal [Memory Grossly Normal] : memory grossly normal [Alert and Oriented x3] : oriented to person, place, and time [Normal Mood] : the mood was normal

## 2022-12-13 NOTE — PLAN
[FreeTextEntry1] : Cardiology\par history of hypercholesterolemia - continue Rosuvastatin Calcium 5mg p.o.q.d. as directed - continue low cholesterol/low fat diet - check FLP and LFTs\par Endocrinology\par hypothyroidism - continue Levothyroxine Sodium 75mcg p.o.q.d. as directed and Liothyronine Sodium 5mcg p.o.q.d. as directed - check thyroid panel\par hyperglycemia - continue low carbohydrate/low sugar diet - check hemoglobin A1C\par Vitamin D insufficiency - continue Vitamin D 2000 IU p.o.q.d. with a meal as directed\par Immunization\par S/p COVID-19 Vaccine (Moderna, x2) - check COVID-19 Aftab Domain Antibody - previously advised to follow up at pharmacy for booster\par Infectious Disease\par Check COVID-19 Nucleocapsid Antibody\par \par check CMP, FLP, hemoglobin A1C, thyroid panel, COVID-19 Aftab Domain Antibody, and COVID-19 Nucleocapsid Antibody

## 2022-12-14 ENCOUNTER — NON-APPOINTMENT (OUTPATIENT)
Age: 72
End: 2022-12-14

## 2022-12-14 LAB
ALBUMIN SERPL ELPH-MCNC: 4.8 G/DL
ALP BLD-CCNC: 71 U/L
ALT SERPL-CCNC: 19 U/L
ANION GAP SERPL CALC-SCNC: 11 MMOL/L
AST SERPL-CCNC: 21 U/L
BILIRUB SERPL-MCNC: 0.3 MG/DL
BUN SERPL-MCNC: 14 MG/DL
CALCIUM SERPL-MCNC: 10.2 MG/DL
CHLORIDE SERPL-SCNC: 104 MMOL/L
CHOLEST SERPL-MCNC: 163 MG/DL
CO2 SERPL-SCNC: 29 MMOL/L
COVID-19 NUCLEOCAPSID  GAM ANTIBODY INTERPRETATION: NEGATIVE
COVID-19 SPIKE DOMAIN ANTIBODY INTERPRETATION: POSITIVE
CREAT SERPL-MCNC: 0.59 MG/DL
EGFR: 95 ML/MIN/1.73M2
ESTIMATED AVERAGE GLUCOSE: 120 MG/DL
GLUCOSE SERPL-MCNC: 103 MG/DL
HBA1C MFR BLD HPLC: 5.8 %
HDLC SERPL-MCNC: 48 MG/DL
LDLC SERPL CALC-MCNC: 99 MG/DL
NONHDLC SERPL-MCNC: 115 MG/DL
POTASSIUM SERPL-SCNC: 4.4 MMOL/L
PROT SERPL-MCNC: 7.2 G/DL
SARS-COV-2 AB SERPL IA-ACNC: >250 U/ML
SARS-COV-2 AB SERPL QL IA: 0.09 INDEX
SODIUM SERPL-SCNC: 143 MMOL/L
T3 SERPL-MCNC: 150 NG/DL
T3FREE SERPL-MCNC: 3.78 PG/ML
T3RU NFR SERPL: 1.1 TBI
T4 FREE SERPL-MCNC: 1.3 NG/DL
T4 SERPL-MCNC: 8.1 UG/DL
THYROGLOB AB SERPL-ACNC: <20 IU/ML
THYROPEROXIDASE AB SERPL IA-ACNC: 194 IU/ML
TRIGL SERPL-MCNC: 82 MG/DL
TSH SERPL-ACNC: 1.09 UIU/ML

## 2023-04-03 ENCOUNTER — RX RENEWAL (OUTPATIENT)
Age: 73
End: 2023-04-03

## 2023-05-10 LAB
ALBUMIN SERPL ELPH-MCNC: 4.5 G/DL
ALP BLD-CCNC: 63 U/L
ALT SERPL-CCNC: 16 U/L
ANION GAP SERPL CALC-SCNC: 11 MMOL/L
AST SERPL-CCNC: 19 U/L
BILIRUB SERPL-MCNC: 0.4 MG/DL
BUN SERPL-MCNC: 16 MG/DL
CALCIUM SERPL-MCNC: 10.1 MG/DL
CHLORIDE SERPL-SCNC: 105 MMOL/L
CHOLEST SERPL-MCNC: 145 MG/DL
CO2 SERPL-SCNC: 27 MMOL/L
CREAT SERPL-MCNC: 0.6 MG/DL
EGFR: 94 ML/MIN/1.73M2
ESTIMATED AVERAGE GLUCOSE: 120 MG/DL
GLUCOSE SERPL-MCNC: 109 MG/DL
HBA1C MFR BLD HPLC: 5.8 %
HDLC SERPL-MCNC: 44 MG/DL
LDLC SERPL CALC-MCNC: 86 MG/DL
NONHDLC SERPL-MCNC: 101 MG/DL
POTASSIUM SERPL-SCNC: 4.5 MMOL/L
PROT SERPL-MCNC: 7.3 G/DL
SODIUM SERPL-SCNC: 143 MMOL/L
TRIGL SERPL-MCNC: 78 MG/DL

## 2023-06-08 ENCOUNTER — NON-APPOINTMENT (OUTPATIENT)
Age: 73
End: 2023-06-08

## 2023-06-08 ENCOUNTER — APPOINTMENT (OUTPATIENT)
Dept: CARDIOLOGY | Facility: CLINIC | Age: 73
End: 2023-06-08
Payer: MEDICARE

## 2023-06-08 VITALS
BODY MASS INDEX: 21.53 KG/M2 | OXYGEN SATURATION: 98 % | HEART RATE: 78 BPM | SYSTOLIC BLOOD PRESSURE: 135 MMHG | DIASTOLIC BLOOD PRESSURE: 74 MMHG | HEIGHT: 62 IN | WEIGHT: 117 LBS

## 2023-06-08 DIAGNOSIS — R00.2 PALPITATIONS: ICD-10-CM

## 2023-06-08 PROCEDURE — 93000 ELECTROCARDIOGRAM COMPLETE: CPT

## 2023-06-08 PROCEDURE — 99215 OFFICE O/P EST HI 40 MIN: CPT

## 2023-07-09 ENCOUNTER — RX RENEWAL (OUTPATIENT)
Age: 73
End: 2023-07-09

## 2023-07-13 ENCOUNTER — RX RENEWAL (OUTPATIENT)
Age: 73
End: 2023-07-13

## 2023-09-19 ENCOUNTER — RESULT CHARGE (OUTPATIENT)
Age: 73
End: 2023-09-19

## 2023-09-21 ENCOUNTER — LABORATORY RESULT (OUTPATIENT)
Age: 73
End: 2023-09-21

## 2023-09-21 ENCOUNTER — APPOINTMENT (OUTPATIENT)
Dept: INTERNAL MEDICINE | Facility: CLINIC | Age: 73
End: 2023-09-21
Payer: MEDICARE

## 2023-09-21 ENCOUNTER — NON-APPOINTMENT (OUTPATIENT)
Age: 73
End: 2023-09-21

## 2023-09-21 VITALS
WEIGHT: 115.38 LBS | TEMPERATURE: 96.3 F | OXYGEN SATURATION: 98 % | RESPIRATION RATE: 16 BRPM | HEIGHT: 62 IN | DIASTOLIC BLOOD PRESSURE: 84 MMHG | SYSTOLIC BLOOD PRESSURE: 130 MMHG | BODY MASS INDEX: 21.23 KG/M2 | HEART RATE: 73 BPM

## 2023-09-21 DIAGNOSIS — R10.9 UNSPECIFIED ABDOMINAL PAIN: ICD-10-CM

## 2023-09-21 DIAGNOSIS — Z00.00 ENCOUNTER FOR GENERAL ADULT MEDICAL EXAMINATION W/OUT ABNORMAL FINDINGS: ICD-10-CM

## 2023-09-21 DIAGNOSIS — I65.29 OCCLUSION AND STENOSIS OF UNSPECIFIED CAROTID ARTERY: ICD-10-CM

## 2023-09-21 DIAGNOSIS — E55.9 VITAMIN D DEFICIENCY, UNSPECIFIED: ICD-10-CM

## 2023-09-21 DIAGNOSIS — Z13.21 ENCOUNTER FOR SCREENING FOR NUTRITIONAL DISORDER: ICD-10-CM

## 2023-09-21 PROCEDURE — G0444 DEPRESSION SCREEN ANNUAL: CPT | Mod: 59

## 2023-09-21 PROCEDURE — 99214 OFFICE O/P EST MOD 30 MIN: CPT | Mod: 25

## 2023-09-21 PROCEDURE — G0439: CPT

## 2023-09-21 PROCEDURE — 93000 ELECTROCARDIOGRAM COMPLETE: CPT | Mod: 59

## 2023-09-21 PROCEDURE — 36415 COLL VENOUS BLD VENIPUNCTURE: CPT

## 2023-09-24 LAB
25(OH)D3 SERPL-MCNC: 67 NG/ML
ALBUMIN SERPL ELPH-MCNC: 4.8 G/DL
ALP BLD-CCNC: 65 U/L
ALT SERPL-CCNC: 20 U/L
ANION GAP SERPL CALC-SCNC: 10 MMOL/L
AST SERPL-CCNC: 22 U/L
BASOPHILS # BLD AUTO: 0.03 K/UL
BASOPHILS NFR BLD AUTO: 0.6 %
BILIRUB SERPL-MCNC: 0.3 MG/DL
BUN SERPL-MCNC: 13 MG/DL
CALCIUM SERPL-MCNC: 9.8 MG/DL
CHLORIDE SERPL-SCNC: 104 MMOL/L
CHOLEST SERPL-MCNC: 163 MG/DL
CO2 SERPL-SCNC: 28 MMOL/L
CREAT SERPL-MCNC: 0.58 MG/DL
EGFR: 95 ML/MIN/1.73M2
EOSINOPHIL # BLD AUTO: 0.06 K/UL
EOSINOPHIL NFR BLD AUTO: 1.2 %
ESTIMATED AVERAGE GLUCOSE: 123 MG/DL
GLUCOSE SERPL-MCNC: 109 MG/DL
HBA1C MFR BLD HPLC: 5.9 %
HCT VFR BLD CALC: 47.9 %
HDLC SERPL-MCNC: 49 MG/DL
HGB BLD-MCNC: 14.7 G/DL
IMM GRANULOCYTES NFR BLD AUTO: 0.2 %
IRON SERPL-MCNC: 102 UG/DL
LDLC SERPL CALC-MCNC: 97 MG/DL
LYMPHOCYTES # BLD AUTO: 1.38 K/UL
LYMPHOCYTES NFR BLD AUTO: 26.7 %
MAGNESIUM SERPL-MCNC: 2.2 MG/DL
MAN DIFF?: NORMAL
MCHC RBC-ENTMCNC: 29.3 PG
MCHC RBC-ENTMCNC: 30.7 GM/DL
MCV RBC AUTO: 95.4 FL
MONOCYTES # BLD AUTO: 0.42 K/UL
MONOCYTES NFR BLD AUTO: 8.1 %
NEUTROPHILS # BLD AUTO: 3.26 K/UL
NEUTROPHILS NFR BLD AUTO: 63.2 %
NONHDLC SERPL-MCNC: 114 MG/DL
PLATELET # BLD AUTO: 257 K/UL
POTASSIUM SERPL-SCNC: 4.4 MMOL/L
PROT SERPL-MCNC: 7.1 G/DL
RBC # BLD: 5.02 M/UL
RBC # FLD: 13.3 %
SODIUM SERPL-SCNC: 142 MMOL/L
T3FREE SERPL-MCNC: 3.93 PG/ML
T4 FREE SERPL-MCNC: 1.1 NG/DL
TRIGL SERPL-MCNC: 92 MG/DL
TSH SERPL-ACNC: 1.41 UIU/ML
VIT B12 SERPL-MCNC: 785 PG/ML
WBC # FLD AUTO: 5.16 K/UL

## 2023-09-26 LAB — VIT A SERPL-MCNC: 42 UG/DL

## 2023-09-27 LAB
MENADIONE SERPL-MCNC: 0.5 NG/ML
VIT B6 SERPL-MCNC: 75.2 UG/L

## 2023-09-28 LAB — VIT B1 SERPL-MCNC: 197.9 NMOL/L

## 2023-10-01 LAB — VIT B2 SERPL-MCNC: 323 UG/L

## 2024-04-17 ENCOUNTER — RX RENEWAL (OUTPATIENT)
Age: 74
End: 2024-04-17

## 2024-04-17 RX ORDER — ROSUVASTATIN CALCIUM 5 MG/1
5 TABLET, FILM COATED ORAL
Qty: 90 | Refills: 0 | Status: ACTIVE | COMMUNITY
Start: 2022-10-18

## 2024-05-06 ENCOUNTER — APPOINTMENT (OUTPATIENT)
Dept: ORTHOPEDIC SURGERY | Facility: CLINIC | Age: 74
End: 2024-05-06
Payer: MEDICARE

## 2024-05-06 VITALS
HEART RATE: 74 BPM | HEIGHT: 62 IN | DIASTOLIC BLOOD PRESSURE: 74 MMHG | WEIGHT: 117 LBS | SYSTOLIC BLOOD PRESSURE: 109 MMHG | BODY MASS INDEX: 21.53 KG/M2

## 2024-05-06 DIAGNOSIS — M65.341 TRIGGER FINGER, RIGHT RING FINGER: ICD-10-CM

## 2024-05-06 PROCEDURE — 20550 NJX 1 TENDON SHEATH/LIGAMENT: CPT | Mod: RT

## 2024-05-06 PROCEDURE — 99203 OFFICE O/P NEW LOW 30 MIN: CPT | Mod: 25

## 2024-05-06 RX ORDER — LIDOCAINE HYDROCHLORIDE 10 MG/ML
1 INJECTION, SOLUTION INFILTRATION; PERINEURAL
Refills: 0 | Status: COMPLETED | OUTPATIENT
Start: 2024-05-06

## 2024-05-06 RX ORDER — BETAMETHA AC,SOD PHOS/WATER/PF 6 MG/ML
6 (3-3) VIAL (ML) INJECTION
Qty: 1 | Refills: 0 | Status: COMPLETED | OUTPATIENT
Start: 2024-05-06

## 2024-05-06 RX ADMIN — BETAMETHASONE ACETATE AND BETAMETHASONE SODIUM PHOSPHATE 1 MG/ML: 3; 3 INJECTION, SUSPENSION INTRA-ARTICULAR; INTRALESIONAL; INTRAMUSCULAR; SOFT TISSUE at 00:00

## 2024-05-06 RX ADMIN — LIDOCAINE HYDROCHLORIDE 0.5 %: 10 INJECTION, SOLUTION EPIDURAL; INFILTRATION; INTRACAUDAL; PERINEURAL at 00:00

## 2024-05-06 NOTE — ADDENDUM
[FreeTextEntry1] : I, Hardik Camacho, acted solely as a scribe for Dr. Greenfield on this date on 5/6/24.

## 2024-05-06 NOTE — HISTORY OF PRESENT ILLNESS
[Right] : right hand dominant [FreeTextEntry1] : She comes in today for evaluation of swelling, locking, and pain in her right ring finger with no known injury. She can't fully extend or bend the finger. She denies any numbness.

## 2024-05-06 NOTE — PHYSICAL EXAM
[de-identified] :  - Constitutional: This is a female in no obvious distress.   - Psych: Patient is alert and oriented to person, place and time.  Patient has a normal mood and affect. - Cardiovascular: Normal pulses throughout the upper extremities.  No significant varicosities are noted in the upper extremities.  - Neuro: Strength and sensation are intact throughout the upper extremities.  Patient has normal coordination. - Respiratory:  Patient exhibits no evidence of shortness of breath or difficulty breathing. - Skin: No rashes, lesions, or other abnormalities are noted in the upper extremities. ---   Examination of her right ring finger demonstrates swelling and tenderness along the A1 pulley.  She has a mild PIP joint flexion contracture.  There is no obvious triggering.  She is neurovascularly intact distally.

## 2024-05-06 NOTE — END OF VISIT
[FreeTextEntry3] : This note was written by Hardik Camacho on 5/6/24 acting solely as a scribe for Dr. John Greenfield.   All medical record entries made by the Scribe were at my, Dr. John Greenfield, direction and personally dictated by me on 5/6/24. I have personally reviewed the chart and agree that the record accurately reflects my personal performance of the history, physical exam, assessment and plan.

## 2024-05-06 NOTE — DISCUSSION/SUMMARY
[FreeTextEntry1] : She has findings consistent with a right ring finger trigger finger.   I had a discussion with the patient regarding today's visit, the prognosis of this diagnosis, and treatment recommendations and options. At this time, I recommended a cortisone injection at her right ring finger trigger finger. She opted to proceed. She will follow up in one month if symptoms persist.  The patient has agreed to the above plan of management and has expressed full understanding.  All questions were fully answered to the patient's satisfaction.   My cumulative time spent on this visit included: Preparation for the visit, review of the medical records, review of pertinent diagnostic studies, examination and counseling of the patient on the above diagnosis, treatment plan and prognosis, orders of diagnostic tests, medication and/or appropriate procedures and documentation in the medical records of today's visit.

## 2024-05-08 ENCOUNTER — APPOINTMENT (OUTPATIENT)
Dept: INTERNAL MEDICINE | Facility: CLINIC | Age: 74
End: 2024-05-08
Payer: MEDICARE

## 2024-05-08 ENCOUNTER — LABORATORY RESULT (OUTPATIENT)
Age: 74
End: 2024-05-08

## 2024-05-08 VITALS
WEIGHT: 117 LBS | TEMPERATURE: 97.5 F | HEART RATE: 67 BPM | OXYGEN SATURATION: 97 % | HEIGHT: 62 IN | SYSTOLIC BLOOD PRESSURE: 144 MMHG | RESPIRATION RATE: 16 BRPM | DIASTOLIC BLOOD PRESSURE: 70 MMHG | BODY MASS INDEX: 21.53 KG/M2

## 2024-05-08 VITALS — DIASTOLIC BLOOD PRESSURE: 70 MMHG | SYSTOLIC BLOOD PRESSURE: 138 MMHG

## 2024-05-08 DIAGNOSIS — M25.561 PAIN IN RIGHT KNEE: ICD-10-CM

## 2024-05-08 DIAGNOSIS — M25.562 PAIN IN RIGHT KNEE: ICD-10-CM

## 2024-05-08 DIAGNOSIS — E78.5 HYPERLIPIDEMIA, UNSPECIFIED: ICD-10-CM

## 2024-05-08 DIAGNOSIS — R73.03 PREDIABETES.: ICD-10-CM

## 2024-05-08 DIAGNOSIS — E03.9 HYPOTHYROIDISM, UNSPECIFIED: ICD-10-CM

## 2024-05-08 DIAGNOSIS — M81.0 AGE-RELATED OSTEOPOROSIS W/OUT CURRENT PATHOLOGICAL FRACTURE: ICD-10-CM

## 2024-05-08 DIAGNOSIS — R73.9 HYPERGLYCEMIA, UNSPECIFIED: ICD-10-CM

## 2024-05-08 DIAGNOSIS — G89.29 PAIN IN RIGHT KNEE: ICD-10-CM

## 2024-05-08 PROCEDURE — G2211 COMPLEX E/M VISIT ADD ON: CPT

## 2024-05-08 PROCEDURE — 36415 COLL VENOUS BLD VENIPUNCTURE: CPT

## 2024-05-08 PROCEDURE — 99214 OFFICE O/P EST MOD 30 MIN: CPT

## 2024-05-08 NOTE — HEALTH RISK ASSESSMENT
[Yes] : Yes [2 - 4 times a month (2 pts)] : 2-4 times a month (2 points) [1 or 2 (0 pts)] : 1 or 2 (0 points) [Never (0 pts)] : Never (0 points) [No] : In the past 12 months have you used drugs other than those required for medical reasons? No [No falls in past year] : Patient reported no falls in the past year [0] : 2) Feeling down, depressed, or hopeless: Not at all (0) [PHQ-2 Negative - No further assessment needed] : PHQ-2 Negative - No further assessment needed [Fully functional (bathing, dressing, toileting, transferring, walking, feeding)] : Fully functional (bathing, dressing, toileting, transferring, walking, feeding) [Fully functional (using the telephone, shopping, preparing meals, housekeeping, doing laundry, using] : Fully functional and needs no help or supervision to perform IADLs (using the telephone, shopping, preparing meals, housekeeping, doing laundry, using transportation, managing medications and managing finances) [Audit-CScore] : 2 [[id number]] : 0 [Former] : Former [MammogramDate] : 06/23 [MammogramComments] : Benign findings; no mammographic or ultrasonographic evidence of malignancy; Rx given for Mammogram and Bilateral Breast US. [BoneDensityDate] : 06/23 [BoneDensityComments] : Osteoporosis.  [ColonoscopyDate] : 05/21 [ColonoscopyComments] : Normal; repeat in 2026. [HepatitisCDate] : 02/20 [HepatitisCComments] : Nonreactive.

## 2024-05-08 NOTE — REVIEW OF SYSTEMS
[Negative] : Heme/Lymph [Fever] : no fever [Chills] : no chills [Fatigue] : no fatigue [Redness] : no redness [Earache] : no earache [Hearing Loss] : no hearing loss [Nosebleed] : no nosebleeds [Sore Throat] : no sore throat [Chest Pain] : no chest pain [Palpitations] : no palpitations [Leg Claudication] : no leg claudication [Shortness Of Breath] : no shortness of breath [Wheezing] : no wheezing [Dysuria] : no dysuria [Joint Pain] : no joint pain [Itching] : no itching [Headache] : no headache [Fainting] : no fainting [Anxiety] : no anxiety [Depression] : no depression [Easy Bruising] : no easy bruising [Swollen Glands] : no swollen glands [FreeTextEntry9] : trigger finger s/p injection and b/l knee pain

## 2024-05-08 NOTE — COUNSELING
[Benefits of weight loss discussed] : Benefits of weight loss discussed [Decrease Portions] : decrease portions [Good understanding] : Patient has a good understanding of disease, goals and obesity follow-up plan [FreeTextEntry2] : weight bearing exercise LOw glycemic  no abdominal pain/no decreased eating/drinking/no diarrhea/no headache/no rash/no vomiting

## 2024-05-08 NOTE — PLAN
[FreeTextEntry1] : Cardiology history of hypercholesterolemia - continue low cholesterol/low fat diet - check FLP today.  Endocrinology hypothyroidism - continue Levothyroxine Sodium 75mcg p.o.q.d. as directed and Liothyronine Sodium 5mcg p.o.q.d. as directed - check TSH and free  t4 hyperglycemia - continue low carbohydrate/low sugar diet - check hemoglobin A1C. Advised low glycemic diet. Vitamin D insufficiency - continue Vitamin D 2000 IU p.o.q.d. with a meal as directed - check Vitamin D   Rheum - Reviewed recent bone density scan showed osteoporosis-patient currently not on any treatment.  States she had adverse side effects to medication in the past referred patient to rheumatology for evaluation and treatment.  Orthopedic bilateral knee pain-recommend x-rays of bilateral knee follow-up with orthopedic consider gel injections if a candidate.  Shingrix - discussed, patient to determine if vaccine is covered under medical benefits of current insurance plan and follow up for 1st dose if interested; otherwise, instructed to follow up at the pharmacy for vaccine  Patient declined  I spent 31 Minutes with the patient, half of which we discussed finding on physical exam and coordinated care.  As well as reviewed my plans and follow ups. Dragon speech recognition software was used to create portions of this document.  An attempt at proofreading has been made to minimize errors please call if any questions arise.

## 2024-05-08 NOTE — HISTORY OF PRESENT ILLNESS
[FreeTextEntry1] : Fasting labs, evaluation of b/l knee pain  [de-identified] : Ms. PAZ is a 74 year- female, with a past medical history as noted below, who present to the office today for fasting labs and evaluation of b/l knee pain.

## 2024-05-08 NOTE — ASSESSMENT
[FreeTextEntry1] : Ms. PAZ is a 74 year female, with a past medical history as noted above, who present to the office today for for fasting labs and b/l knee pain

## 2024-05-08 NOTE — PHYSICAL EXAM
[No Acute Distress] : no acute distress [Well Nourished] : well nourished [Well Developed] : well developed [Well-Appearing] : well-appearing [Normal Sclera/Conjunctiva] : normal sclera/conjunctiva [PERRL] : pupils equal round and reactive to light [EOMI] : extraocular movements intact [Normal Outer Ear/Nose] : the outer ears and nose were normal in appearance [Normal Oropharynx] : the oropharynx was normal [Normal TMs] : both tympanic membranes were normal [No JVD] : no jugular venous distention [No Lymphadenopathy] : no lymphadenopathy [Supple] : supple [Thyroid Normal, No Nodules] : the thyroid was normal and there were no nodules present [No Respiratory Distress] : no respiratory distress  [No Accessory Muscle Use] : no accessory muscle use [Clear to Auscultation] : lungs were clear to auscultation bilaterally [Normal Rate] : normal rate  [Regular Rhythm] : with a regular rhythm [Normal S1, S2] : normal S1 and S2 [No Carotid Bruits] : no carotid bruits [No Abdominal Bruit] : a ~M bruit was not heard ~T in the abdomen [Pedal Pulses Present] : the pedal pulses are present [No Edema] : there was no peripheral edema [No Palpable Aorta] : no palpable aorta [No Extremity Clubbing/Cyanosis] : no extremity clubbing/cyanosis [Declined Breast Exam] : declined breast exam  [Soft] : abdomen soft [Non Tender] : non-tender [Non-distended] : non-distended [No Masses] : no abdominal mass palpated [No HSM] : no HSM [Normal Bowel Sounds] : normal bowel sounds [Normal Posterior Cervical Nodes] : no posterior cervical lymphadenopathy [Normal Anterior Cervical Nodes] : no anterior cervical lymphadenopathy [No CVA Tenderness] : no CVA  tenderness [No Spinal Tenderness] : no spinal tenderness [No Joint Swelling] : no joint swelling [Grossly Normal Strength/Tone] : grossly normal strength/tone [No Rash] : no rash [Coordination Grossly Intact] : coordination grossly intact [No Focal Deficits] : no focal deficits [Normal Gait] : normal gait [Deep Tendon Reflexes (DTR)] : deep tendon reflexes were 2+ and symmetric [Speech Grossly Normal] : speech grossly normal [Memory Grossly Normal] : memory grossly normal [Normal Affect] : the affect was normal [Alert and Oriented x3] : oriented to person, place, and time [Normal Mood] : the mood was normal [Normal Insight/Judgement] : insight and judgment were intact [de-identified] : As per gynecology

## 2024-05-09 LAB
25(OH)D3 SERPL-MCNC: 82 NG/ML
ALBUMIN SERPL ELPH-MCNC: 4.5 G/DL
ALP BLD-CCNC: 61 U/L
ALT SERPL-CCNC: 16 U/L
ANION GAP SERPL CALC-SCNC: 13 MMOL/L
AST SERPL-CCNC: 18 U/L
BASOPHILS # BLD AUTO: 0.04 K/UL
BASOPHILS NFR BLD AUTO: 0.7 %
BILIRUB SERPL-MCNC: 0.2 MG/DL
BUN SERPL-MCNC: 14 MG/DL
CALCIUM SERPL-MCNC: 9.7 MG/DL
CHLORIDE SERPL-SCNC: 103 MMOL/L
CHOLEST SERPL-MCNC: 145 MG/DL
CO2 SERPL-SCNC: 26 MMOL/L
CREAT SERPL-MCNC: 0.57 MG/DL
EGFR: 95 ML/MIN/1.73M2
EOSINOPHIL # BLD AUTO: 0.07 K/UL
EOSINOPHIL NFR BLD AUTO: 1.3 %
ESTIMATED AVERAGE GLUCOSE: 117 MG/DL
GLUCOSE SERPL-MCNC: 108 MG/DL
HBA1C MFR BLD HPLC: 5.7 %
HCT VFR BLD CALC: 41.9 %
HDLC SERPL-MCNC: 44 MG/DL
HGB BLD-MCNC: 13.4 G/DL
IMM GRANULOCYTES NFR BLD AUTO: 0.2 %
LDLC SERPL CALC-MCNC: 80 MG/DL
LYMPHOCYTES # BLD AUTO: 1.7 K/UL
LYMPHOCYTES NFR BLD AUTO: 30.5 %
MAN DIFF?: NORMAL
MCHC RBC-ENTMCNC: 29.7 PG
MCHC RBC-ENTMCNC: 32 GM/DL
MCV RBC AUTO: 92.9 FL
MONOCYTES # BLD AUTO: 0.53 K/UL
MONOCYTES NFR BLD AUTO: 9.5 %
NEUTROPHILS # BLD AUTO: 3.23 K/UL
NEUTROPHILS NFR BLD AUTO: 57.8 %
NONHDLC SERPL-MCNC: 101 MG/DL
PLATELET # BLD AUTO: 259 K/UL
POTASSIUM SERPL-SCNC: 4.3 MMOL/L
PROT SERPL-MCNC: 6.7 G/DL
RBC # BLD: 4.51 M/UL
RBC # FLD: 13.3 %
SODIUM SERPL-SCNC: 142 MMOL/L
T3 SERPL-MCNC: 141 NG/DL
T3FREE SERPL-MCNC: 3.51 PG/ML
T3RU NFR SERPL: 1 TBI
T4 FREE SERPL-MCNC: 1.6 NG/DL
T4 SERPL-MCNC: 9.1 UG/DL
TRIGL SERPL-MCNC: 119 MG/DL
TSH SERPL-ACNC: 0.42 UIU/ML
WBC # FLD AUTO: 5.58 K/UL

## 2024-05-10 LAB
THYROGLOB AB SERPL-ACNC: <20 IU/ML
THYROPEROXIDASE AB SERPL IA-ACNC: 143 IU/ML

## 2024-07-08 ENCOUNTER — RX RENEWAL (OUTPATIENT)
Age: 74
End: 2024-07-08

## 2024-07-12 ENCOUNTER — RX RENEWAL (OUTPATIENT)
Age: 74
End: 2024-07-12

## 2024-07-17 NOTE — DATA REVIEWED
[No studies available for review at this time.] : No studies available for review at this time. [FreeTextEntry1] : reviewed cardio consult 11/15 and 8/16/21 Statement Selected

## 2024-09-20 ENCOUNTER — NON-APPOINTMENT (OUTPATIENT)
Age: 74
End: 2024-09-20

## 2024-09-20 NOTE — PHYSICAL EXAM
[de-identified] :  - Constitutional: This is a female in no obvious distress.   - Psych: Patient is alert and oriented to person, place and time.  Patient has a normal mood and affect. - Cardiovascular: Normal pulses throughout the upper extremities.  No significant varicosities are noted in the upper extremities.  - Neuro: Strength and sensation are intact throughout the upper extremities.  Patient has normal coordination. - Respiratory:  Patient exhibits no evidence of shortness of breath or difficulty breathing. - Skin: No rashes, lesions, or other abnormalities are noted in the upper extremities. ---   Examination of her right ring finger demonstrates swelling and tenderness along the A1 pulley.  She has a mild PIP joint flexion contracture.  There is no obvious triggering.  She is neurovascularly intact distally.

## 2024-09-20 NOTE — HISTORY OF PRESENT ILLNESS
[FreeTextEntry1] : Less than 5 months status post right ring finger trigger cortisone injection #1.  She returns today,

## 2024-09-30 ENCOUNTER — APPOINTMENT (OUTPATIENT)
Dept: ORTHOPEDIC SURGERY | Facility: CLINIC | Age: 74
End: 2024-09-30
Payer: MEDICARE

## 2024-09-30 DIAGNOSIS — M65.341 TRIGGER FINGER, RIGHT RING FINGER: ICD-10-CM

## 2024-09-30 PROCEDURE — 20550 NJX 1 TENDON SHEATH/LIGAMENT: CPT | Mod: RT

## 2024-09-30 RX ORDER — BETAMETHA AC,SOD PHOS/WATER/PF 6 MG/ML
6 (3-3) VIAL (ML) INJECTION
Qty: 1 | Refills: 0 | Status: COMPLETED | OUTPATIENT
Start: 2024-09-30

## 2024-09-30 RX ORDER — LIDOCAINE HYDROCHLORIDE 10 MG/ML
1 INJECTION, SOLUTION INFILTRATION; PERINEURAL
Refills: 0 | Status: COMPLETED | OUTPATIENT
Start: 2024-09-30

## 2024-09-30 RX ADMIN — BETAMETHASONE ACETATE AND BETAMETHASONE SODIUM PHOSPHATE 1 MG/ML: 3; 3 INJECTION, SUSPENSION INTRA-ARTICULAR; INTRALESIONAL; INTRAMUSCULAR; SOFT TISSUE at 00:00

## 2024-09-30 RX ADMIN — LIDOCAINE HYDROCHLORIDE 0.5 %: 10 INJECTION, SOLUTION INFILTRATION; PERINEURAL at 00:00

## 2024-09-30 NOTE — PROCEDURE
[FreeTextEntry1] :  - After discussion of risks and benefits, the patient agreed to proceed with a cortisone injection. - Side: right - Finger: ring finger trigger finger - Medications: 0.5cc of 1% Lidocaine and 1cc of Celestone Solupan, 6mg/cc, using sterile technique. - Patient tolerated the procedure well without complications. - Patient was told that the symptoms may worsen for a day or two, and should then begin to improve. - Instructions: Patient was instructed on activity modification for the next several days. - Follow-up: According to her symptoms.

## 2024-09-30 NOTE — PHYSICAL EXAM
[de-identified] :  - Constitutional: This is a female in no obvious distress.   - Psych: Patient is alert and oriented to person, place and time.  Patient has a normal mood and affect. - Cardiovascular: Normal pulses throughout the upper extremities.  No significant varicosities are noted in the upper extremities.  - Neuro: Strength and sensation are intact throughout the upper extremities.  Patient has normal coordination.  ---   Examination of her right ring finger demonstrates swelling and tenderness along the A1 pulley.  She has a mild PIP joint flexion contracture.  There is no obvious triggering, as she cannot flex to the point of triggering.  She is neurovascularly intact distally.

## 2024-09-30 NOTE — PHYSICAL EXAM
[de-identified] :  - Constitutional: This is a female in no obvious distress.   - Psych: Patient is alert and oriented to person, place and time.  Patient has a normal mood and affect. - Cardiovascular: Normal pulses throughout the upper extremities.  No significant varicosities are noted in the upper extremities.  - Neuro: Strength and sensation are intact throughout the upper extremities.  Patient has normal coordination.  ---   Examination of her right ring finger demonstrates swelling and tenderness along the A1 pulley.  She has a mild PIP joint flexion contracture.  There is no obvious triggering, as she cannot flex to the point of triggering.  She is neurovascularly intact distally.

## 2024-09-30 NOTE — DISCUSSION/SUMMARY
[FreeTextEntry1] : I had a discussion regarding today's visit, the diagnosis and treatment recommendations and options.  We also discussed changes since the last visit.  At this time, we discussed treatment options including a second cortisone injection or surgical management. I told her that my first recommendation would be a second cortisone injection. She opted to proceed. She will follow-up according to her symptoms.   The patient has agreed to the above plan of management and has expressed full understanding.  All questions were fully answered to the patient's satisfaction.  My cumulative time spent on today's visit included: Preparation for the visit, review of the medical records, review of pertinent diagnostic studies, examination and counseling of the patient on the above diagnosis, treatment plan and prognosis, orders of diagnostic tests, medications and/or appropriate procedures and documentation in the medical records of today's visit.

## 2024-09-30 NOTE — HISTORY OF PRESENT ILLNESS
[FreeTextEntry1] : Less than 5 months status post right ring finger trigger cortisone injection #1.  She returns today for right ring finger trigger finger symptoms. She reports that her previous injection provided her symptoms with relief for about 3 weeks. She reports occasional locking. She rates her pain as a 1/10, which worsens when gripping objects. She denies clicking or swelling.

## 2024-09-30 NOTE — ADDENDUM
[FreeTextEntry1] :  I, Abhay Malave, acted solely as a scribe for Dr. Greenfield on this date on 09/30/2024.

## 2024-09-30 NOTE — END OF VISIT
[FreeTextEntry3] : This note was written by Abhay Malave on 09/30/2024 acting solely as a scribe for Dr. John Greenfield.   All medical record entries made by the Scribe were at my, Dr. John Greenfield, direction and personally dictated by me on 09/30/2024. I have personally reviewed the chart and agree that the record accurately reflects my personal performance of the history, physical exam, assessment and plan.

## 2024-10-07 ENCOUNTER — APPOINTMENT (OUTPATIENT)
Dept: CARDIOLOGY | Facility: CLINIC | Age: 74
End: 2024-10-07
Payer: MEDICARE

## 2024-10-07 ENCOUNTER — RX RENEWAL (OUTPATIENT)
Age: 74
End: 2024-10-07

## 2024-10-07 VITALS
WEIGHT: 115 LBS | DIASTOLIC BLOOD PRESSURE: 83 MMHG | BODY MASS INDEX: 21.16 KG/M2 | OXYGEN SATURATION: 94 % | HEART RATE: 77 BPM | HEIGHT: 62 IN | SYSTOLIC BLOOD PRESSURE: 146 MMHG

## 2024-10-07 DIAGNOSIS — I35.1 NONRHEUMATIC AORTIC (VALVE) INSUFFICIENCY: ICD-10-CM

## 2024-10-07 DIAGNOSIS — E78.5 HYPERLIPIDEMIA, UNSPECIFIED: ICD-10-CM

## 2024-10-07 DIAGNOSIS — I34.0 NONRHEUMATIC MITRAL (VALVE) INSUFFICIENCY: ICD-10-CM

## 2024-10-07 PROCEDURE — 93000 ELECTROCARDIOGRAM COMPLETE: CPT

## 2024-10-07 PROCEDURE — 99215 OFFICE O/P EST HI 40 MIN: CPT

## 2024-10-07 PROCEDURE — G2211 COMPLEX E/M VISIT ADD ON: CPT

## 2024-10-08 ENCOUNTER — RX RENEWAL (OUTPATIENT)
Age: 74
End: 2024-10-08

## 2024-10-08 ENCOUNTER — APPOINTMENT (OUTPATIENT)
Dept: CARDIOLOGY | Facility: CLINIC | Age: 74
End: 2024-10-08
Payer: MEDICARE

## 2024-10-08 PROCEDURE — 93015 CV STRESS TEST SUPVJ I&R: CPT

## 2024-10-16 ENCOUNTER — RX RENEWAL (OUTPATIENT)
Age: 74
End: 2024-10-16

## 2025-01-02 ENCOUNTER — RX RENEWAL (OUTPATIENT)
Age: 75
End: 2025-01-02

## 2025-01-08 ENCOUNTER — RX RENEWAL (OUTPATIENT)
Age: 75
End: 2025-01-08

## 2025-04-01 ENCOUNTER — RX RENEWAL (OUTPATIENT)
Age: 75
End: 2025-04-01

## 2025-04-03 ENCOUNTER — NON-APPOINTMENT (OUTPATIENT)
Age: 75
End: 2025-04-03

## 2025-04-14 ENCOUNTER — APPOINTMENT (OUTPATIENT)
Dept: ORTHOPEDIC SURGERY | Facility: CLINIC | Age: 75
End: 2025-04-14

## 2025-04-14 DIAGNOSIS — M65.341 TRIGGER FINGER, RIGHT RING FINGER: ICD-10-CM

## 2025-04-14 PROCEDURE — 20550 NJX 1 TENDON SHEATH/LIGAMENT: CPT | Mod: RT

## 2025-04-14 PROCEDURE — 99213 OFFICE O/P EST LOW 20 MIN: CPT | Mod: 25

## 2025-04-14 RX ORDER — BETAMETHA AC,SOD PHOS/WATER/PF 6 MG/ML
6 (3-3) VIAL (ML) INJECTION
Qty: 2 | Refills: 0 | Status: COMPLETED | OUTPATIENT
Start: 2025-04-14

## 2025-04-14 RX ORDER — LIDOCAINE HCL/PF 10 MG/ML
1 VIAL (ML) INJECTION
Refills: 0 | Status: COMPLETED | OUTPATIENT
Start: 2025-04-14

## 2025-04-14 RX ADMIN — LIDOCAINE HYDROCHLORIDE 0.5 %: 10 INJECTION, SOLUTION INFILTRATION; PERINEURAL at 00:00

## 2025-04-14 RX ADMIN — BETAMETHASONE ACETATE AND BETAMETHASONE SODIUM PHOSPHATE 1 MG/ML: 3; 3 INJECTION, SUSPENSION INTRA-ARTICULAR; INTRALESIONAL; INTRAMUSCULAR; SOFT TISSUE at 00:00

## 2025-04-29 ENCOUNTER — APPOINTMENT (OUTPATIENT)
Dept: CARDIOLOGY | Facility: CLINIC | Age: 75
End: 2025-04-29
Payer: MEDICARE

## 2025-04-29 ENCOUNTER — NON-APPOINTMENT (OUTPATIENT)
Age: 75
End: 2025-04-29

## 2025-04-29 VITALS
DIASTOLIC BLOOD PRESSURE: 74 MMHG | OXYGEN SATURATION: 98 % | SYSTOLIC BLOOD PRESSURE: 135 MMHG | BODY MASS INDEX: 21.16 KG/M2 | HEART RATE: 72 BPM | WEIGHT: 115 LBS | HEIGHT: 62 IN

## 2025-04-29 DIAGNOSIS — I34.0 NONRHEUMATIC MITRAL (VALVE) INSUFFICIENCY: ICD-10-CM

## 2025-04-29 DIAGNOSIS — R00.2 PALPITATIONS: ICD-10-CM

## 2025-04-29 DIAGNOSIS — I35.1 NONRHEUMATIC AORTIC (VALVE) INSUFFICIENCY: ICD-10-CM

## 2025-04-29 PROCEDURE — 93000 ELECTROCARDIOGRAM COMPLETE: CPT

## 2025-04-29 PROCEDURE — 99215 OFFICE O/P EST HI 40 MIN: CPT

## 2025-04-29 PROCEDURE — G2211 COMPLEX E/M VISIT ADD ON: CPT

## 2025-05-06 ENCOUNTER — APPOINTMENT (OUTPATIENT)
Dept: INTERNAL MEDICINE | Facility: CLINIC | Age: 75
End: 2025-05-06
Payer: MEDICARE

## 2025-05-06 VITALS
TEMPERATURE: 97.7 F | HEIGHT: 62 IN | HEART RATE: 77 BPM | OXYGEN SATURATION: 97 % | WEIGHT: 116 LBS | DIASTOLIC BLOOD PRESSURE: 70 MMHG | BODY MASS INDEX: 21.35 KG/M2 | SYSTOLIC BLOOD PRESSURE: 118 MMHG

## 2025-05-06 DIAGNOSIS — E03.9 HYPOTHYROIDISM, UNSPECIFIED: ICD-10-CM

## 2025-05-06 DIAGNOSIS — Z12.39 ENCOUNTER FOR OTHER SCREENING FOR MALIGNANT NEOPLASM OF BREAST: ICD-10-CM

## 2025-05-06 DIAGNOSIS — I65.29 OCCLUSION AND STENOSIS OF UNSPECIFIED CAROTID ARTERY: ICD-10-CM

## 2025-05-06 DIAGNOSIS — M81.0 AGE-RELATED OSTEOPOROSIS W/OUT CURRENT PATHOLOGICAL FRACTURE: ICD-10-CM

## 2025-05-06 DIAGNOSIS — E78.5 HYPERLIPIDEMIA, UNSPECIFIED: ICD-10-CM

## 2025-05-06 DIAGNOSIS — Z13.820 ENCOUNTER FOR SCREENING FOR OSTEOPOROSIS: ICD-10-CM

## 2025-05-06 DIAGNOSIS — E55.9 VITAMIN D DEFICIENCY, UNSPECIFIED: ICD-10-CM

## 2025-05-06 DIAGNOSIS — Z79.899 OTHER LONG TERM (CURRENT) DRUG THERAPY: ICD-10-CM

## 2025-05-06 DIAGNOSIS — R73.9 HYPERGLYCEMIA, UNSPECIFIED: ICD-10-CM

## 2025-05-06 PROCEDURE — 99214 OFFICE O/P EST MOD 30 MIN: CPT

## 2025-05-06 PROCEDURE — G2211 COMPLEX E/M VISIT ADD ON: CPT

## 2025-05-06 PROCEDURE — 36415 COLL VENOUS BLD VENIPUNCTURE: CPT

## 2025-05-07 LAB
25(OH)D3 SERPL-MCNC: 87.6 NG/ML
ALBUMIN SERPL ELPH-MCNC: 4.6 G/DL
ALP BLD-CCNC: 76 U/L
ALT SERPL-CCNC: 24 U/L
ANION GAP SERPL CALC-SCNC: 14 MMOL/L
AST SERPL-CCNC: 22 U/L
BASOPHILS # BLD AUTO: 0.03 K/UL
BASOPHILS NFR BLD AUTO: 0.7 %
BILIRUB SERPL-MCNC: 0.4 MG/DL
BUN SERPL-MCNC: 12 MG/DL
CALCIUM SERPL-MCNC: 10.4 MG/DL
CHLORIDE SERPL-SCNC: 103 MMOL/L
CHOLEST SERPL-MCNC: 177 MG/DL
CO2 SERPL-SCNC: 26 MMOL/L
CREAT SERPL-MCNC: 0.53 MG/DL
EGFRCR SERPLBLD CKD-EPI 2021: 96 ML/MIN/1.73M2
EOSINOPHIL # BLD AUTO: 0.07 K/UL
EOSINOPHIL NFR BLD AUTO: 1.5 %
ESTIMATED AVERAGE GLUCOSE: 117 MG/DL
GLUCOSE SERPL-MCNC: 111 MG/DL
HBA1C MFR BLD HPLC: 5.7 %
HCT VFR BLD CALC: 44.1 %
HDLC SERPL-MCNC: 53 MG/DL
HGB BLD-MCNC: 14 G/DL
IMM GRANULOCYTES NFR BLD AUTO: 0.4 %
IRON SERPL-MCNC: 117 UG/DL
LDLC SERPL-MCNC: 111 MG/DL
LYMPHOCYTES # BLD AUTO: 1.01 K/UL
LYMPHOCYTES NFR BLD AUTO: 22 %
MAGNESIUM SERPL-MCNC: 2 MG/DL
MAN DIFF?: NORMAL
MCHC RBC-ENTMCNC: 28.9 PG
MCHC RBC-ENTMCNC: 31.7 G/DL
MCV RBC AUTO: 91.1 FL
MEV IGG FLD QL IA: >300 AU/ML
MEV IGG+IGM SER-IMP: POSITIVE
MONOCYTES # BLD AUTO: 0.42 K/UL
MONOCYTES NFR BLD AUTO: 9.1 %
MUV AB SER-ACNC: POSITIVE
MUV IGG SER QL IA: 33.7 AU/ML
NEUTROPHILS # BLD AUTO: 3.05 K/UL
NEUTROPHILS NFR BLD AUTO: 66.3 %
NONHDLC SERPL-MCNC: 124 MG/DL
PLATELET # BLD AUTO: 252 K/UL
POTASSIUM SERPL-SCNC: 4.2 MMOL/L
PROT SERPL-MCNC: 7 G/DL
RBC # BLD: 4.84 M/UL
RBC # FLD: 13.1 %
RUBV IGG FLD-ACNC: 4.95 INDEX
RUBV IGG SER-IMP: POSITIVE
SODIUM SERPL-SCNC: 143 MMOL/L
T3FREE SERPL-MCNC: 3.65 PG/ML
T4 FREE SERPL-MCNC: 1.3 NG/DL
THYROGLOB AB SERPL-ACNC: 23 IU/ML
THYROPEROXIDASE AB SERPL IA-ACNC: 97.7 IU/ML
TRIGL SERPL-MCNC: 70 MG/DL
TSH SERPL-ACNC: 0.75 UIU/ML
VIT B12 SERPL-MCNC: 912 PG/ML
VZV AB TITR SER: POSITIVE
VZV IGG SER IF-ACNC: 21.2 S/CO
WBC # FLD AUTO: 4.6 K/UL

## 2025-05-11 PROBLEM — Z13.820 SCREENING FOR OSTEOPOROSIS: Status: ACTIVE | Noted: 2025-05-06

## 2025-05-12 LAB
VIT A SERPL-MCNC: 44.9 UG/DL
VIT B1 SERPL-MCNC: 147 NMOL/L
VIT B2 SERPL-MCNC: 222 UG/L
VIT B6 SERPL-MCNC: 19.1 UG/L
VIT C SERPL-MCNC: 1.1 MG/DL

## 2025-06-28 ENCOUNTER — RX RENEWAL (OUTPATIENT)
Age: 75
End: 2025-06-28

## 2025-07-30 ENCOUNTER — EMERGENCY (EMERGENCY)
Facility: HOSPITAL | Age: 75
LOS: 0 days | Discharge: HOME | End: 2025-07-30
Attending: EMERGENCY MEDICINE
Payer: MEDICARE

## 2025-07-30 VITALS
HEART RATE: 63 BPM | RESPIRATION RATE: 17 BRPM | SYSTOLIC BLOOD PRESSURE: 129 MMHG | DIASTOLIC BLOOD PRESSURE: 54 MMHG | TEMPERATURE: 98 F | OXYGEN SATURATION: 100 %

## 2025-07-30 VITALS — WEIGHT: 121.47 LBS

## 2025-07-30 DIAGNOSIS — R07.89 OTHER CHEST PAIN: ICD-10-CM

## 2025-07-30 DIAGNOSIS — E03.9 HYPOTHYROIDISM, UNSPECIFIED: ICD-10-CM

## 2025-07-30 DIAGNOSIS — H91.90 UNSPECIFIED HEARING LOSS, UNSPECIFIED EAR: ICD-10-CM

## 2025-07-30 DIAGNOSIS — K50.90 CROHN'S DISEASE, UNSPECIFIED, WITHOUT COMPLICATIONS: ICD-10-CM

## 2025-07-30 DIAGNOSIS — Z88.2 ALLERGY STATUS TO SULFONAMIDES: ICD-10-CM

## 2025-07-30 LAB
ALBUMIN SERPL ELPH-MCNC: 3.3 G/DL — SIGNIFICANT CHANGE UP (ref 3.3–5)
ALP SERPL-CCNC: 64 U/L — SIGNIFICANT CHANGE UP (ref 40–120)
ALT FLD-CCNC: 45 U/L — SIGNIFICANT CHANGE UP (ref 12–78)
ANION GAP SERPL CALC-SCNC: 3 MMOL/L — LOW (ref 5–17)
AST SERPL-CCNC: 28 U/L — SIGNIFICANT CHANGE UP (ref 15–37)
BASOPHILS # BLD AUTO: 0.01 K/UL — SIGNIFICANT CHANGE UP (ref 0–0.2)
BASOPHILS NFR BLD AUTO: 0.3 % — SIGNIFICANT CHANGE UP (ref 0–2)
BILIRUB SERPL-MCNC: 0.3 MG/DL — SIGNIFICANT CHANGE UP (ref 0.2–1.2)
BUN SERPL-MCNC: 10 MG/DL — SIGNIFICANT CHANGE UP (ref 7–23)
CALCIUM SERPL-MCNC: 9.1 MG/DL — SIGNIFICANT CHANGE UP (ref 8.5–10.1)
CHLORIDE SERPL-SCNC: 108 MMOL/L — SIGNIFICANT CHANGE UP (ref 96–108)
CO2 SERPL-SCNC: 28 MMOL/L — SIGNIFICANT CHANGE UP (ref 22–31)
CREAT SERPL-MCNC: 0.63 MG/DL — SIGNIFICANT CHANGE UP (ref 0.5–1.3)
EGFR: 92 ML/MIN/1.73M2 — SIGNIFICANT CHANGE UP
EGFR: 92 ML/MIN/1.73M2 — SIGNIFICANT CHANGE UP
EOSINOPHIL # BLD AUTO: 0.04 K/UL — SIGNIFICANT CHANGE UP (ref 0–0.5)
EOSINOPHIL NFR BLD AUTO: 1.1 % — SIGNIFICANT CHANGE UP (ref 0–6)
GLUCOSE SERPL-MCNC: 98 MG/DL — SIGNIFICANT CHANGE UP (ref 70–99)
HCT VFR BLD CALC: 42.1 % — SIGNIFICANT CHANGE UP (ref 34.5–45)
HGB BLD-MCNC: 13.3 G/DL — SIGNIFICANT CHANGE UP (ref 11.5–15.5)
IMM GRANULOCYTES # BLD AUTO: 0.01 K/UL — SIGNIFICANT CHANGE UP (ref 0–0.07)
IMM GRANULOCYTES NFR BLD AUTO: 0.3 % — SIGNIFICANT CHANGE UP (ref 0–0.9)
LIDOCAIN IGE QN: 26 U/L — SIGNIFICANT CHANGE UP (ref 13–75)
LYMPHOCYTES # BLD AUTO: 0.84 K/UL — LOW (ref 1–3.3)
LYMPHOCYTES NFR BLD AUTO: 23.5 % — SIGNIFICANT CHANGE UP (ref 13–44)
MAGNESIUM SERPL-MCNC: 1.9 MG/DL — SIGNIFICANT CHANGE UP (ref 1.6–2.6)
MCHC RBC-ENTMCNC: 28.8 PG — SIGNIFICANT CHANGE UP (ref 27–34)
MCHC RBC-ENTMCNC: 31.6 G/DL — LOW (ref 32–36)
MCV RBC AUTO: 91.1 FL — SIGNIFICANT CHANGE UP (ref 80–100)
MONOCYTES # BLD AUTO: 0.39 K/UL — SIGNIFICANT CHANGE UP (ref 0–0.9)
MONOCYTES NFR BLD AUTO: 10.9 % — SIGNIFICANT CHANGE UP (ref 2–14)
NEUTROPHILS # BLD AUTO: 2.29 K/UL — SIGNIFICANT CHANGE UP (ref 1.8–7.4)
NEUTROPHILS NFR BLD AUTO: 63.9 % — SIGNIFICANT CHANGE UP (ref 43–77)
NRBC # BLD AUTO: 0 K/UL — SIGNIFICANT CHANGE UP (ref 0–0)
NRBC # FLD: 0 K/UL — SIGNIFICANT CHANGE UP (ref 0–0)
NRBC BLD AUTO-RTO: 0 /100 WBCS — SIGNIFICANT CHANGE UP (ref 0–0)
PLATELET # BLD AUTO: 207 K/UL — SIGNIFICANT CHANGE UP (ref 150–400)
PMV BLD: 9.8 FL — SIGNIFICANT CHANGE UP (ref 7–13)
POTASSIUM SERPL-MCNC: 3.7 MMOL/L — SIGNIFICANT CHANGE UP (ref 3.5–5.3)
POTASSIUM SERPL-SCNC: 3.7 MMOL/L — SIGNIFICANT CHANGE UP (ref 3.5–5.3)
PROT SERPL-MCNC: 6.8 GM/DL — SIGNIFICANT CHANGE UP (ref 6–8.3)
RBC # BLD: 4.62 M/UL — SIGNIFICANT CHANGE UP (ref 3.8–5.2)
RBC # FLD: 12.9 % — SIGNIFICANT CHANGE UP (ref 10.3–14.5)
SODIUM SERPL-SCNC: 139 MMOL/L — SIGNIFICANT CHANGE UP (ref 135–145)
TROPONIN I, HIGH SENSITIVITY RESULT: 5.07 NG/L — SIGNIFICANT CHANGE UP
WBC # BLD: 3.58 K/UL — LOW (ref 3.8–10.5)
WBC # FLD AUTO: 3.58 K/UL — LOW (ref 3.8–10.5)

## 2025-07-30 PROCEDURE — 36415 COLL VENOUS BLD VENIPUNCTURE: CPT

## 2025-07-30 PROCEDURE — 84484 ASSAY OF TROPONIN QUANT: CPT

## 2025-07-30 PROCEDURE — 85025 COMPLETE CBC W/AUTO DIFF WBC: CPT

## 2025-07-30 PROCEDURE — 99285 EMERGENCY DEPT VISIT HI MDM: CPT | Mod: FS

## 2025-07-30 PROCEDURE — 71045 X-RAY EXAM CHEST 1 VIEW: CPT

## 2025-07-30 PROCEDURE — 96375 TX/PRO/DX INJ NEW DRUG ADDON: CPT

## 2025-07-30 PROCEDURE — 74177 CT ABD & PELVIS W/CONTRAST: CPT

## 2025-07-30 PROCEDURE — 83690 ASSAY OF LIPASE: CPT

## 2025-07-30 PROCEDURE — 83735 ASSAY OF MAGNESIUM: CPT

## 2025-07-30 PROCEDURE — 96374 THER/PROPH/DIAG INJ IV PUSH: CPT | Mod: XU

## 2025-07-30 PROCEDURE — 71045 X-RAY EXAM CHEST 1 VIEW: CPT | Mod: 26

## 2025-07-30 PROCEDURE — 80053 COMPREHEN METABOLIC PANEL: CPT

## 2025-07-30 PROCEDURE — 93005 ELECTROCARDIOGRAM TRACING: CPT

## 2025-07-30 PROCEDURE — 93010 ELECTROCARDIOGRAM REPORT: CPT

## 2025-07-30 PROCEDURE — 74177 CT ABD & PELVIS W/CONTRAST: CPT | Mod: 26

## 2025-07-30 PROCEDURE — 99285 EMERGENCY DEPT VISIT HI MDM: CPT | Mod: 25

## 2025-07-30 RX ORDER — BUDESONIDE 0.25 MG/2ML
3 SUSPENSION RESPIRATORY (INHALATION)
Qty: 252 | Refills: 0
Start: 2025-07-30 | End: 2025-08-26

## 2025-07-30 RX ORDER — MAGNESIUM, ALUMINUM HYDROXIDE 200-200 MG
30 TABLET,CHEWABLE ORAL ONCE
Refills: 0 | Status: COMPLETED | OUTPATIENT
Start: 2025-07-30 | End: 2025-07-30

## 2025-07-30 RX ORDER — ACETAMINOPHEN 500 MG/5ML
1000 LIQUID (ML) ORAL ONCE
Refills: 0 | Status: COMPLETED | OUTPATIENT
Start: 2025-07-30 | End: 2025-07-30

## 2025-07-30 RX ADMIN — Medication 500 MILLILITER(S): at 14:05

## 2025-07-30 RX ADMIN — Medication 20 MILLIGRAM(S): at 14:07

## 2025-07-30 RX ADMIN — Medication 30 MILLILITER(S): at 14:05

## 2025-07-30 RX ADMIN — Medication 400 MILLIGRAM(S): at 14:05

## 2025-07-30 NOTE — ED STATDOCS - NSFOLLOWUPINSTRUCTIONS_ED_ALL_ED_FT
You were seen in the ED for chest pain and abdominal pain.     Please follow-up with Dr. Herrera within this week for further evaluation.    Take budesonide one tablet THREE times a day for the next 4 weeks.     Please also follow-up with your cardiologist for further evaluation.     Take Tylenol 650mg (Two regular strength 325 mg pills) every 4-6 hours or two extra strength 500mg tablets every 8 hours as needed for pain or fevers. Do not exceed 1000mg at one time or 4000mg in a 24 hour period.     You were seen in the ED for abdominal pain.     Advance activity as tolerated.  Continue all previously prescribed medications as directed unless otherwise instructed.  Follow up with your primary care physician in 48-72 hours- bring copies of your results.  Return to the ER for worsening or persistent symptoms, including but not limited to worsening/persistent pain, fevers, vomiting, chest pain, black or bloody stools, fevers, passing out, lightheadedness, dizziness, inability to pass bowel movements or gas, abdominal distension/swelling, and/or ANY NEW OR CONCERNING SYMPTOMS.

## 2025-07-30 NOTE — ED STATDOCS - PHYSICAL EXAMINATION
Constitutional: NAD AAOx3  Eyes: PERRLA EOMI  Head: Normocephalic atraumatic  Mouth: MMM  Cardiac: regular rate   Resp: unlabored breathing  GI: Mild diffuse abdominal tenderness to palpation  Neuro: grossly normal and intact  Skin: No visible rashes   MSK: Normal peripheral pulses, no lower extremity swelling Constitutional: NAD AAOx3  Eyes: PERRLA EOMI  Head: Normocephalic atraumatic  Mouth: MMM  Cardiac: regular rate   Resp: unlabored breathing clear /bl   GI: Mild diffuse abdominal tenderness to palpation  Neuro: grossly normal and intact  Skin: No visible rashes   MSK: Normal peripheral pulses, no lower extremity swelling

## 2025-07-30 NOTE — ED STATDOCS - ATTENDING APP SHARED VISIT CONTRIBUTION OF CARE
I, Mello Marvin MD, personally saw the patient with ACP.  I have personally performed a face to face diagnostic evaluation on this patient.   The initial assessment was performed by myself and then the patient was handed off to the ACP. The patient was followed and re-evaluated by the ACP. All labs, imaging and procedures were evaluated and performed by the ACP and I was available for consultation if any questions in the patients care came up.   I personally made/approved the management plan and take responsibility for the patient management.

## 2025-07-30 NOTE — ED ADULT NURSE NOTE - NSFALLUNIVINTERV_ED_ALL_ED
Bed/Stretcher in lowest position, wheels locked, appropriate side rails in place/Call bell, personal items and telephone in reach/Instruct patient to call for assistance before getting out of bed/chair/stretcher/Non-slip footwear applied when patient is off stretcher/[place] to call system/Physically safe environment - no spills, clutter or unnecessary equipment/Purposeful proactive rounding/Room/bathroom lighting operational, light cord in reach

## 2025-07-30 NOTE — ED ADULT NURSE NOTE - OBJECTIVE STATEMENT
Patient presents to the ER complaints of abdominal pain, decreased po intake, fatigue, possible fever, and chest discomfort. History of chrons.

## 2025-07-30 NOTE — ED STATDOCS - PROGRESS NOTE DETAILS
MORIAH Hernández: 76 y/o F with pmhx of Crohn's disease (not currently on any medications), hypothyroidism, hearing impaired with hearing aids who presents to the ED c/o CP, abd pain and subjective fever that started yesterday. Pt notes that abd pain felt similar to prior Crohn's flares. States that she took tylenol yesterday for sx without significant improvement. Pt also notes intermittent episodes of CP with no radiation of pain that started yesterday as well. Indicates pain is mainly midsternal and to the left chest. Was having intermittent episodes of lightheadedness as well, currently denies any lightheadedness. Denies SOB, palpitations, leg swelling, n/v, melena/hematochezia, change in bowel habits. Pt follows with a GI doctor from [facility]. Follows with Dr. Soto as cardiologist - last had stress in 10/2024 which was normal. echo in 2021 which was normal. Reports allergy to sulfa.  Physical exam: VSS. well appearing, in no acute distress. Heart and lung sounds normal. Diffuse abd tenderness to palpation, mild. no rebound or guarding. abd is soft, non-distended. no cva tenderness b/l. No LE edema.   Plan: labs, CXR, CT MORIAH Hernández: labs unremarkable. trop negative. CXR with no acute findings. CT with segmental mural wall thickening in right lower small bowel consistent with active inflammation. Pt reassessed. states that she is feeling better. denies any additional episodes of chest pain. Spoke with Dr. Redman with [facility] who recommends budesonide 3mg tablet TID for the next 4 weeks and f/u in office within the week. Spoke with pt regarding this, pt understands and agrees with plan for d/c.

## 2025-07-30 NOTE — ED ADULT TRIAGE NOTE - CHIEF COMPLAINT QUOTE
Pt ambulatory to the ED c/o intermittent chest pain x years and "Crohn's flare up." Pt endorsing abd pain, decrease PO intake and subjective fevers. Denies diarrhea, sob or dizziness.

## 2025-07-30 NOTE — ED STATDOCS - OBJECTIVE STATEMENT
75-year-old female history of Crohn's disease hypothyroidism presents to the emergency department for abdominal pain.  Patient states symptoms started yesterday.  Feels like previous Crohn's flares.  No bloody stool did feel like she had a slight fever but did not take her temperature.  States that the abdominal pain improved a bit however she developed some chest discomfort as well so came in for evaluation.  No shortness of breath no cough.  Patient has had a stress test before last one was less than a year ago which was normal.  Exam with normal peripheral pulses no lower extremity swelling mild diffuse abdominal tenderness to palpation.  Patient with abdominal pain with a history of Crohn's disease likely Crohn's flare will CT.  Patient also with some mild chest pain heart score is 3 pain has been since yesterday and has had stress within a year.  Low suspicion for ACS.  No signs or concern for PE or dissection will check labs CT EKG symptom control reassess.

## 2025-07-30 NOTE — ED STATDOCS - PATIENT PORTAL LINK FT
You can access the FollowMyHealth Patient Portal offered by [facility] by registering at the following website: [email or web address]. By joining [employer]’s FollowMyHealth portal, you will also be able to view your health information using other applications (apps) compatible with our system.